# Patient Record
Sex: FEMALE | Race: BLACK OR AFRICAN AMERICAN | NOT HISPANIC OR LATINO
[De-identification: names, ages, dates, MRNs, and addresses within clinical notes are randomized per-mention and may not be internally consistent; named-entity substitution may affect disease eponyms.]

---

## 2023-05-23 ENCOUNTER — APPOINTMENT (OUTPATIENT)
Dept: ORTHOPEDIC SURGERY | Facility: CLINIC | Age: 68
End: 2023-05-23
Payer: MEDICARE

## 2023-05-23 VITALS — BODY MASS INDEX: 34.75 KG/M2 | HEIGHT: 60 IN | WEIGHT: 177 LBS

## 2023-05-23 DIAGNOSIS — Z78.9 OTHER SPECIFIED HEALTH STATUS: ICD-10-CM

## 2023-05-23 PROBLEM — Z00.00 ENCOUNTER FOR PREVENTIVE HEALTH EXAMINATION: Status: ACTIVE | Noted: 2023-05-23

## 2023-05-23 PROCEDURE — 99204 OFFICE O/P NEW MOD 45 MIN: CPT

## 2023-05-23 PROCEDURE — 72110 X-RAY EXAM L-2 SPINE 4/>VWS: CPT

## 2023-05-30 NOTE — ASSESSMENT
[FreeTextEntry1] : 67 year old female presents with acute exacerbation of chronic low back pain. The pain radiates to her legs.  She has paresthesias and numbness in the left leg.  Pain is worse in the left than the right.  She is otherwise neurologically intact She is limited in the distance she can walk due to pain. In the past she was told she has stenosis.  She will be sent for a lumbar MRI. She can continue gabapentin. She can continue PT. She will followup after her lumbar MRI. We discussed red flag symptoms that would require emergent evaluation. She knows to call with any questions or concerns or if her symptoms acutely worsen.

## 2023-05-30 NOTE — HISTORY OF PRESENT ILLNESS
[de-identified] : 67 year old female presents with acute exacerbation of chronic low back pain. The pain radiates to her legs.  She has paresthesias and numbness in the left leg.  Pain is worse in the left than the right.  She denies recent illness, fevers, weakness, balance problems, saddle anesthesia, urinary retention or fecal incontinence. She is limited in the distance she can walk due to pain. In the past she was told she has stenosis.  She takes gabapentin with minimal relief.

## 2023-05-30 NOTE — PHYSICAL EXAM
[de-identified] : General: No acute distress, conversant, well-nourished.\par Head: Normocephalic, atraumatic\par Neck: trachea midline, FROM\par Heart: normotensive and normal rate and rhythm\par Lungs: No labored breathing\par Skin: No abrasions, no rashes, no edema\par Psych: Alert and oriented to person, place and time\par Extremities: no peripheral edema or digital cyanosis\par Gait: Normal gait. Can perform tandem gait.  \par Vascular: warm and well perfused distally, palpable distal pulses\par \par MSK:\par Lumbar spine:\par No tenderness to palpation.  No step-off, no deformity.\par \par NEURO EXAM:\par Sensation \par Left L2  -  2/2            \par Left L3  -  2/2\par Left L4  -  2/2\par Left L5  -  2/2\par Left S1  -  2/2\par \par Right L2  -  2/2            \par Right L3  -  2/2\par Right L4  -  2/2\par Right L5  -  2/2\par Right S1  -  2/2\par \par Motor: \par Left L2 (hip flexion)                            5/5                \par Left L3 (knee extension)                   5/5                \par Left L4 (ankle dorsiflexion)                 5/5                \par Left L5 (long toe extensor)                5/5                \par Left S1 (ankle plantar flexion)           5/5\par \par Right L2 (hip flexion)                            5/5                \par Right L3 (knee extension)                   5/5                \par Right L4 (ankle dorsiflexion)                 5/5                \par Right L5 (long toe extensor)                5/5                \par Right S1 (ankle plantar flexion)           5/5\par \par Reflexes: Normal and symmetric\par Negative clonus.  Down-going Babinski.   [de-identified] : I ordered radiographs to evaluate the patient's symptoms.\par \par Lumbar 4 view radiographs taken in the office today show no dislocation or fracture.  Lumbar spondylosis.  No instability on dynamic series.

## 2023-06-15 ENCOUNTER — APPOINTMENT (OUTPATIENT)
Dept: ORTHOPEDIC SURGERY | Facility: CLINIC | Age: 68
End: 2023-06-15
Payer: MEDICAID

## 2023-06-15 PROCEDURE — 99214 OFFICE O/P EST MOD 30 MIN: CPT

## 2023-06-18 NOTE — PHYSICAL EXAM
[de-identified] : Lumbar MRI (6/13/23) Multilevel degenerative changes contributing to moderate spinal canal stenosis at L4-5 and mild thecal sac impingement at T11-12 and L3-4.\par \par Mild to moderate foraminal stenosis bilaterally at L3-4 and L4-5, and mild bilaterally at L2-3.\par \par Grade 1 anterolisthesis of L4 on L5.\par \par Mild to moderate multilevel facet arthrosis, worst at L4-5.\par \par Lumbar 4 view radiographs taken in the office today show no dislocation or fracture.  Lumbar spondylosis.  No instability on dynamic series. [de-identified] : General: No acute distress, conversant, well-nourished.\par Head: Normocephalic, atraumatic\par Neck: trachea midline, FROM\par Heart: normotensive and normal rate and rhythm\par Lungs: No labored breathing\par Skin: No abrasions, no rashes, no edema\par Psych: Alert and oriented to person, place and time\par Extremities: no peripheral edema or digital cyanosis\par Gait: Normal gait. Can perform tandem gait.  \par Vascular: warm and well perfused distally, palpable distal pulses\par \par MSK:\par Lumbar spine:\par No tenderness to palpation.  No step-off, no deformity.\par \par NEURO EXAM:\par Sensation \par Left L2  -  2/2            \par Left L3  -  2/2\par Left L4  -  2/2\par Left L5  -  2/2\par Left S1  -  2/2\par \par Right L2  -  2/2            \par Right L3  -  2/2\par Right L4  -  2/2\par Right L5  -  2/2\par Right S1  -  2/2\par \par Motor: \par Left L2 (hip flexion)                            5/5                \par Left L3 (knee extension)                   5/5                \par Left L4 (ankle dorsiflexion)                 5/5                \par Left L5 (long toe extensor)                5/5                \par Left S1 (ankle plantar flexion)           5/5\par \par Right L2 (hip flexion)                            5/5                \par Right L3 (knee extension)                   5/5                \par Right L4 (ankle dorsiflexion)                 5/5                \par Right L5 (long toe extensor)                5/5                \par Right S1 (ankle plantar flexion)           5/5\par \par Reflexes: Normal and symmetric\par Negative clonus.  Down-going Babinski.

## 2023-06-18 NOTE — ASSESSMENT
[FreeTextEntry1] : 67 year old female presents with acute exacerbation of chronic low back pain. The pain radiates to her legs.  She has paresthesias and numbness in the left leg.  Pain is worse in the left than the right.  She is otherwise neurologically intact. She is limited in the distance she can walk due to pain. The pain has worsened since her last visit. We reviewed her imaging including her recent MRI. She has L4-L5 spondylolisthesis and stenosis. We discussed treatment options including physical therapy, medications, spinal injections and surgery. Surgery would be a L4-L5 TLIF. We discussed the risks and benefits of surgery.  The risks include anesthesia, blood loss, need for blood transfusion, clots, stroke, myocardial infarct, chronic pain, loss of function, durotomy, infection, hardware failure, nonunion, adjacent segment disease, need for reoperation and damage to neurovascular structures. She understands the risks.  She asked several great questions. She will be scheduled for surgery. We discussed red flag symptoms that would require emergent evaluation. She knows to call with any questions or concerns or if her symptoms acutely worsen.

## 2023-06-18 NOTE — HISTORY OF PRESENT ILLNESS
[de-identified] : 67 year old female presents with acute exacerbation of chronic low back pain. The pain radiates to her legs.  She has paresthesias and numbness in the left leg.  Pain is worse in the left than the right.  She denies recent illness, fevers, weakness, balance problems, saddle anesthesia, urinary retention or fecal incontinence. She is limited in the distance she can walk due to pain. The pain has worsened since her last visit. She is here to review her recent MRI.

## 2023-07-21 ENCOUNTER — NON-APPOINTMENT (OUTPATIENT)
Age: 68
End: 2023-07-21

## 2023-07-21 ENCOUNTER — APPOINTMENT (OUTPATIENT)
Dept: INTERNAL MEDICINE | Facility: CLINIC | Age: 68
End: 2023-07-21
Payer: MEDICARE

## 2023-07-21 VITALS
HEIGHT: 60 IN | WEIGHT: 175 LBS | HEART RATE: 87 BPM | DIASTOLIC BLOOD PRESSURE: 84 MMHG | TEMPERATURE: 97.8 F | OXYGEN SATURATION: 95 % | SYSTOLIC BLOOD PRESSURE: 129 MMHG | BODY MASS INDEX: 34.36 KG/M2

## 2023-07-21 DIAGNOSIS — Z86.69 PERSONAL HISTORY OF OTHER DISEASES OF THE NERVOUS SYSTEM AND SENSE ORGANS: ICD-10-CM

## 2023-07-21 DIAGNOSIS — E78.5 HYPERLIPIDEMIA, UNSPECIFIED: ICD-10-CM

## 2023-07-21 DIAGNOSIS — Z82.49 FAMILY HISTORY OF ISCHEMIC HEART DISEASE AND OTHER DISEASES OF THE CIRCULATORY SYSTEM: ICD-10-CM

## 2023-07-21 DIAGNOSIS — Z01.818 ENCOUNTER FOR OTHER PREPROCEDURAL EXAMINATION: ICD-10-CM

## 2023-07-21 DIAGNOSIS — J45.998 OTHER ASTHMA: ICD-10-CM

## 2023-07-21 DIAGNOSIS — I10 ESSENTIAL (PRIMARY) HYPERTENSION: ICD-10-CM

## 2023-07-21 DIAGNOSIS — J30.2 OTHER SEASONAL ALLERGIC RHINITIS: ICD-10-CM

## 2023-07-21 PROCEDURE — 93000 ELECTROCARDIOGRAM COMPLETE: CPT

## 2023-07-21 PROCEDURE — 36415 COLL VENOUS BLD VENIPUNCTURE: CPT

## 2023-07-21 PROCEDURE — 99203 OFFICE O/P NEW LOW 30 MIN: CPT | Mod: 25

## 2023-07-21 RX ORDER — MELOXICAM 15 MG/1
15 TABLET ORAL
Refills: 0 | Status: ACTIVE | COMMUNITY

## 2023-07-21 RX ORDER — GABAPENTIN 300 MG/1
300 CAPSULE ORAL
Refills: 0 | Status: ACTIVE | COMMUNITY

## 2023-07-21 RX ORDER — CETIRIZINE HYDROCHLORIDE 10 MG/1
10 TABLET, COATED ORAL
Refills: 0 | Status: ACTIVE | COMMUNITY

## 2023-07-21 RX ORDER — FLUTICASONE PROPIONATE 50 UG/1
50 SPRAY, METERED NASAL
Refills: 0 | Status: ACTIVE | COMMUNITY

## 2023-07-21 RX ORDER — OLMESARTAN MEDOXOMIL, AMLODIPINE BESYLATE AND HYDROCHLOROTHIAZIDE 20; 5; 12.5 MG/1; MG/1; MG/1
20-5-12.5 TABLET, FILM COATED ORAL
Refills: 0 | Status: DISCONTINUED | COMMUNITY
End: 2023-07-21

## 2023-07-21 RX ORDER — ROSUVASTATIN CALCIUM 40 MG/1
40 TABLET, FILM COATED ORAL
Refills: 0 | Status: ACTIVE | COMMUNITY

## 2023-07-21 RX ORDER — BUDESONIDE AND FORMOTEROL FUMARATE DIHYDRATE 160; 4.5 UG/1; UG/1
160-4.5 AEROSOL RESPIRATORY (INHALATION)
Refills: 0 | Status: ACTIVE | COMMUNITY

## 2023-07-21 RX ORDER — OLMESARTAN MEDOXOMIL AND HYDROCHLOROTHIAZIDE 20; 12.5 MG/1; MG/1
20-12.5 TABLET ORAL
Refills: 0 | Status: ACTIVE | COMMUNITY

## 2023-07-24 LAB
ALBUMIN SERPL ELPH-MCNC: 4.7 G/DL
ALP BLD-CCNC: 82 U/L
ALT SERPL-CCNC: 27 U/L
ANION GAP SERPL CALC-SCNC: 12 MMOL/L
APTT BLD: 30.1 SEC
AST SERPL-CCNC: 26 U/L
BILIRUB SERPL-MCNC: 0.5 MG/DL
BUN SERPL-MCNC: 18 MG/DL
CALCIUM SERPL-MCNC: 10.1 MG/DL
CHLORIDE SERPL-SCNC: 102 MMOL/L
CO2 SERPL-SCNC: 23 MMOL/L
CREAT SERPL-MCNC: 0.92 MG/DL
EGFR: 69 ML/MIN/1.73M2
ESTIMATED AVERAGE GLUCOSE: 126 MG/DL
GLUCOSE SERPL-MCNC: 94 MG/DL
HBA1C MFR BLD HPLC: 6 %
INR PPP: 1.06 RATIO
POTASSIUM SERPL-SCNC: 4 MMOL/L
PROT SERPL-MCNC: 8.1 G/DL
PT BLD: 12.3 SEC
SODIUM SERPL-SCNC: 137 MMOL/L

## 2023-07-24 NOTE — HISTORY OF PRESENT ILLNESS
[No Pertinent Cardiac History] : no history of aortic stenosis, atrial fibrillation, coronary artery disease, recent myocardial infarction, or implantable device/pacemaker [Asthma] : asthma [No Adverse Anesthesia Reaction] : no adverse anesthesia reaction in self or family member [(Patient denies any chest pain, claudication, dyspnea on exertion, orthopnea, palpitations or syncope)] : Patient denies any chest pain, claudication, dyspnea on exertion, orthopnea, palpitations or syncope [Moderate (4-6 METs)] : Moderate (4-6 METs) [COPD] : no COPD [Sleep Apnea] : no sleep apnea [Smoker] : not a smoker [Chronic Anticoagulation] : no chronic anticoagulation [Chronic Kidney Disease] : no chronic kidney disease [Diabetes] : no diabetes [FreeTextEntry1] : L4-L5 TLIF [FreeTextEntry2] : 8/7/23 [FreeTextEntry3] : Dr. Madera [FreeTextEntry4] : Ms. THEE WEBER is a 67 year old female with history of Lumbar radiculopathy, HTN, HLD, and obesity presenting today to the [facility] prior to spine surgery.  [FreeTextEntry8] : going to the gym and working with  on core strengthening. limited by back pain

## 2023-07-24 NOTE — ASSESSMENT
[High Risk Surgery - Intraperitoneal, Intrathoracic or Supringuinal Vascular Procedures] : High Risk Surgery - Intraperitoneal, Intrathoracic or Supringuinal Vascular Procedures - No (0) [Ischemic Heart Disease] : Ischemic Heart Disease - No (0) [Congestive Heart Failure] : Congestive Heart Failure - No (0) [Prior Cerebrovascular Accident or TIA] : Prior Cerebrovascular Accident or TIA - No (0) [Creatinine >= 2mg/dL (1 Point)] : Creatinine >= 2mg/dL - No (0) [Insulin-dependent Diabetic (1 Point)] : Insulin-dependent Diabetic - No (0) [0] : 0 , RCRI Class: I, Risk of Post-Op Cardiac Complications: 3.9%, 95% CI for Risk Estimate: 2.8% - 5.4% [FreeTextEntry4] : Ms. THEE WEBER is a 67 year old female with history of Lumbar radiculopathy, HTN, HLD, and obesity presenting today to the [facility] prior to Transforaminal Lumbar Interbody Fusion L4-L5. Able to achieve >4 METS and ECG with nonspecific changes. Will obtain prior ECG from PMD for comparison and check labs today.

## 2023-07-28 ENCOUNTER — NON-APPOINTMENT (OUTPATIENT)
Age: 68
End: 2023-07-28

## 2023-08-04 VITALS
DIASTOLIC BLOOD PRESSURE: 86 MMHG | WEIGHT: 177.03 LBS | TEMPERATURE: 98 F | HEART RATE: 66 BPM | SYSTOLIC BLOOD PRESSURE: 124 MMHG | HEIGHT: 60 IN | OXYGEN SATURATION: 95 % | RESPIRATION RATE: 16 BRPM

## 2023-08-04 RX ORDER — POVIDONE-IODINE 5 %
1 AEROSOL (ML) TOPICAL ONCE
Refills: 0 | Status: COMPLETED | OUTPATIENT
Start: 2023-08-07 | End: 2023-08-07

## 2023-08-04 NOTE — H&P ADULT - HISTORY OF PRESENT ILLNESS
67F c/o lower back pain x       Presents for a L4-L5 TLIF with Dr. Madera 67F c/o lower back pain for over 15y. 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 Pt. has numbness in her left leg. Pt. has increased pain with bending over and walking up/down stairs. She has tried conservative treatments including MAXIMO x 3 and physical therapy with no relief of pain.   Presents for a L4-L5 TLIF with Dr. Madera

## 2023-08-04 NOTE — ASU PATIENT PROFILE, ADULT - FALL HARM RISK - HARM RISK INTERVENTIONS

## 2023-08-04 NOTE — H&P ADULT - NSHPLABSRESULTS_GEN_ALL_CORE
Preop CBC, BMP, PT/INR, PTT within normal range and reviewed per medical clearance  Cr- 0.92  A1C 6.0  Preop CXR within normal limits and reviewed per medical clearance   Preop EKG 75bpm NSR within normal limits and reviewed per medical clearance  3M: DOS  T&S DOS

## 2023-08-04 NOTE — H&P ADULT - NSHPPHYSICALEXAM_GEN_ALL_CORE
Gen: 67F NAD  MSK: Decreased lumbar spine ROM secondary to pain      Rest of PE per MD clearance Gen: 67F NAD  MSK: bilateral lower extremities skin intact, no erythema/ecchymosis. SLT INTACT, DP/PT 2+, EHL/TA/GS 5/5. Decreased lumbar spine ROM secondary to pain      Rest of PE per MD clearance

## 2023-08-04 NOTE — H&P ADULT - PROBLEM SELECTOR PLAN 1
Admit to Orthopedic Service for elective L4-5 TLIF     Medically cleared and optimized for surgery by Dr. Ceja

## 2023-08-04 NOTE — H&P ADULT - NSICDXPASTMEDICALHX_GEN_ALL_CORE_FT
PAST MEDICAL HISTORY:  History of carpal tunnel syndrome     History of low back pain     Lumbar radiculopathy     Lumbar stenosis with neurogenic claudication     Spondylolisthesis L4-L5 level

## 2023-08-04 NOTE — H&P ADULT - NSICDXPASTSURGICALHX_GEN_ALL_CORE_FT
PAST SURGICAL HISTORY:  Elective surgery polyp removal from uterus    Elective surgery surgery for carpal tunnel syndrome

## 2023-08-06 ENCOUNTER — TRANSCRIPTION ENCOUNTER (OUTPATIENT)
Age: 68
End: 2023-08-06

## 2023-08-07 ENCOUNTER — INPATIENT (INPATIENT)
Facility: HOSPITAL | Age: 68
LOS: 8 days | Discharge: HOME HEALTH | DRG: 454 | End: 2023-08-16
Attending: STUDENT IN AN ORGANIZED HEALTH CARE EDUCATION/TRAINING PROGRAM | Admitting: STUDENT IN AN ORGANIZED HEALTH CARE EDUCATION/TRAINING PROGRAM
Payer: MEDICARE

## 2023-08-07 ENCOUNTER — TRANSCRIPTION ENCOUNTER (OUTPATIENT)
Age: 68
End: 2023-08-07

## 2023-08-07 ENCOUNTER — APPOINTMENT (OUTPATIENT)
Dept: ORTHOPEDIC SURGERY | Facility: HOSPITAL | Age: 68
End: 2023-08-07

## 2023-08-07 DIAGNOSIS — Z41.9 ENCOUNTER FOR PROCEDURE FOR PURPOSES OTHER THAN REMEDYING HEALTH STATE, UNSPECIFIED: Chronic | ICD-10-CM

## 2023-08-07 DIAGNOSIS — M54.50 LOW BACK PAIN, UNSPECIFIED: ICD-10-CM

## 2023-08-07 LAB
BLD GP AB SCN SERPL QL: NEGATIVE — SIGNIFICANT CHANGE UP
RH IG SCN BLD-IMP: NEGATIVE — SIGNIFICANT CHANGE UP

## 2023-08-07 PROCEDURE — 20939 BONE MARROW ASPIR BONE GRFG: CPT

## 2023-08-07 PROCEDURE — 22840 INSERT SPINE FIXATION DEVICE: CPT

## 2023-08-07 PROCEDURE — 22853 INSJ BIOMECHANICAL DEVICE: CPT

## 2023-08-07 PROCEDURE — 61783 SCAN PROC SPINAL: CPT

## 2023-08-07 PROCEDURE — 22633 ARTHRD CMBN 1NTRSPC LUMBAR: CPT

## 2023-08-07 DEVICE — SET SCREW NXG: Type: IMPLANTABLE DEVICE | Status: FUNCTIONAL

## 2023-08-07 DEVICE — IMPLANTABLE DEVICE: Type: IMPLANTABLE DEVICE | Status: FUNCTIONAL

## 2023-08-07 DEVICE — HEAD POLY BODY TOP LOAD: Type: IMPLANTABLE DEVICE | Status: FUNCTIONAL

## 2023-08-07 DEVICE — SURGIFOAM PAD 8CM X 12.5CM X 10MM (100): Type: IMPLANTABLE DEVICE | Status: FUNCTIONAL

## 2023-08-07 DEVICE — CAGE LORDOTIC FORZA XP 8.5X10X24MM: Type: IMPLANTABLE DEVICE | Status: FUNCTIONAL

## 2023-08-07 DEVICE — SURGIFLO HEMOSTATIC MATRIX KIT: Type: IMPLANTABLE DEVICE | Status: FUNCTIONAL

## 2023-08-07 DEVICE — ROD PRE-LORDOSED 5.5X45MM: Type: IMPLANTABLE DEVICE | Status: FUNCTIONAL

## 2023-08-07 RX ORDER — ONDANSETRON 8 MG/1
4 TABLET, FILM COATED ORAL EVERY 6 HOURS
Refills: 0 | Status: DISCONTINUED | OUTPATIENT
Start: 2023-08-07 | End: 2023-08-16

## 2023-08-07 RX ORDER — ATORVASTATIN CALCIUM 80 MG/1
80 TABLET, FILM COATED ORAL DAILY
Refills: 0 | Status: DISCONTINUED | OUTPATIENT
Start: 2023-08-07 | End: 2023-08-16

## 2023-08-07 RX ORDER — CHLORHEXIDINE GLUCONATE 213 G/1000ML
1 SOLUTION TOPICAL EVERY 12 HOURS
Refills: 0 | Status: COMPLETED | OUTPATIENT
Start: 2023-08-07 | End: 2023-08-07

## 2023-08-07 RX ORDER — SODIUM CHLORIDE 9 MG/ML
1000 INJECTION, SOLUTION INTRAVENOUS
Refills: 0 | Status: DISCONTINUED | OUTPATIENT
Start: 2023-08-07 | End: 2023-08-07

## 2023-08-07 RX ORDER — ROSUVASTATIN CALCIUM 5 MG/1
1 TABLET ORAL
Refills: 0 | DISCHARGE

## 2023-08-07 RX ORDER — GABAPENTIN 400 MG/1
300 CAPSULE ORAL DAILY
Refills: 0 | Status: DISCONTINUED | OUTPATIENT
Start: 2023-08-07 | End: 2023-08-09

## 2023-08-07 RX ORDER — CEFAZOLIN SODIUM 1 G
2000 VIAL (EA) INJECTION EVERY 8 HOURS
Refills: 0 | Status: DISCONTINUED | OUTPATIENT
Start: 2023-08-07 | End: 2023-08-07

## 2023-08-07 RX ORDER — ACETAMINOPHEN 500 MG
1000 TABLET ORAL EVERY 8 HOURS
Refills: 0 | Status: DISCONTINUED | OUTPATIENT
Start: 2023-08-07 | End: 2023-08-16

## 2023-08-07 RX ORDER — BUDESONIDE AND FORMOTEROL FUMARATE DIHYDRATE 160; 4.5 UG/1; UG/1
2 AEROSOL RESPIRATORY (INHALATION) DAILY
Refills: 0 | Status: DISCONTINUED | OUTPATIENT
Start: 2023-08-07 | End: 2023-08-16

## 2023-08-07 RX ORDER — METOCLOPRAMIDE HCL 10 MG
10 TABLET ORAL EVERY 8 HOURS
Refills: 0 | Status: DISCONTINUED | OUTPATIENT
Start: 2023-08-07 | End: 2023-08-16

## 2023-08-07 RX ORDER — METHOCARBAMOL 500 MG/1
500 TABLET, FILM COATED ORAL EVERY 8 HOURS
Refills: 0 | Status: DISCONTINUED | OUTPATIENT
Start: 2023-08-07 | End: 2023-08-10

## 2023-08-07 RX ORDER — OXYCODONE HYDROCHLORIDE 5 MG/1
5 TABLET ORAL EVERY 4 HOURS
Refills: 0 | Status: DISCONTINUED | OUTPATIENT
Start: 2023-08-07 | End: 2023-08-08

## 2023-08-07 RX ORDER — LOSARTAN POTASSIUM 100 MG/1
50 TABLET, FILM COATED ORAL DAILY
Refills: 0 | Status: DISCONTINUED | OUTPATIENT
Start: 2023-08-07 | End: 2023-08-08

## 2023-08-07 RX ORDER — SENNA PLUS 8.6 MG/1
2 TABLET ORAL
Refills: 0 | Status: DISCONTINUED | OUTPATIENT
Start: 2023-08-07 | End: 2023-08-11

## 2023-08-07 RX ORDER — APREPITANT 80 MG/1
40 CAPSULE ORAL ONCE
Refills: 0 | Status: COMPLETED | OUTPATIENT
Start: 2023-08-07 | End: 2023-08-07

## 2023-08-07 RX ORDER — OLMESARTAN MEDOXOMIL-HYDROCHLOROTHIAZIDE 25; 40 MG/1; MG/1
1 TABLET, FILM COATED ORAL
Refills: 0 | DISCHARGE

## 2023-08-07 RX ORDER — CEFAZOLIN SODIUM 1 G
2000 VIAL (EA) INJECTION EVERY 8 HOURS
Refills: 0 | Status: COMPLETED | OUTPATIENT
Start: 2023-08-07 | End: 2023-08-08

## 2023-08-07 RX ORDER — MELOXICAM 15 MG/1
1 TABLET ORAL
Refills: 0 | DISCHARGE

## 2023-08-07 RX ORDER — HYDROMORPHONE HYDROCHLORIDE 2 MG/ML
0.5 INJECTION INTRAMUSCULAR; INTRAVENOUS; SUBCUTANEOUS
Refills: 0 | Status: DISCONTINUED | OUTPATIENT
Start: 2023-08-07 | End: 2023-08-08

## 2023-08-07 RX ORDER — ACETAMINOPHEN 500 MG
1000 TABLET ORAL ONCE
Refills: 0 | Status: COMPLETED | OUTPATIENT
Start: 2023-08-07 | End: 2023-08-07

## 2023-08-07 RX ORDER — CETIRIZINE HYDROCHLORIDE 10 MG/1
1 TABLET ORAL
Refills: 0 | DISCHARGE

## 2023-08-07 RX ORDER — LANOLIN ALCOHOL/MO/W.PET/CERES
5 CREAM (GRAM) TOPICAL AT BEDTIME
Refills: 0 | Status: DISCONTINUED | OUTPATIENT
Start: 2023-08-07 | End: 2023-08-16

## 2023-08-07 RX ADMIN — OXYCODONE HYDROCHLORIDE 5 MILLIGRAM(S): 5 TABLET ORAL at 19:32

## 2023-08-07 RX ADMIN — APREPITANT 40 MILLIGRAM(S): 80 CAPSULE ORAL at 07:42

## 2023-08-07 RX ADMIN — METHOCARBAMOL 500 MILLIGRAM(S): 500 TABLET, FILM COATED ORAL at 22:33

## 2023-08-07 RX ADMIN — Medication 1000 MILLIGRAM(S): at 22:33

## 2023-08-07 RX ADMIN — GABAPENTIN 300 MILLIGRAM(S): 400 CAPSULE ORAL at 22:32

## 2023-08-07 RX ADMIN — Medication 100 MILLIGRAM(S): at 21:19

## 2023-08-07 RX ADMIN — OXYCODONE HYDROCHLORIDE 5 MILLIGRAM(S): 5 TABLET ORAL at 20:20

## 2023-08-07 RX ADMIN — ATORVASTATIN CALCIUM 80 MILLIGRAM(S): 80 TABLET, FILM COATED ORAL at 22:33

## 2023-08-07 RX ADMIN — Medication 1 APPLICATION(S): at 07:42

## 2023-08-07 RX ADMIN — Medication 1000 MILLIGRAM(S): at 07:37

## 2023-08-07 RX ADMIN — HYDROMORPHONE HYDROCHLORIDE 0.5 MILLIGRAM(S): 2 INJECTION INTRAMUSCULAR; INTRAVENOUS; SUBCUTANEOUS at 14:30

## 2023-08-07 RX ADMIN — Medication 5 MILLIGRAM(S): at 22:33

## 2023-08-07 RX ADMIN — CHLORHEXIDINE GLUCONATE 1 APPLICATION(S): 213 SOLUTION TOPICAL at 07:42

## 2023-08-07 NOTE — PHYSICAL THERAPY INITIAL EVALUATION ADULT - ADDITIONAL COMMENTS
Pt currently resides in private home w/ son - 21 [address]. Bedroom is on 3rd floor however family has set up bedroom space on 1st floor for post-surgery. Primarily amb indep w/o AD. Indep w/ showering and dressing tasks. Experienced 1 fall within past 6 months while attempting to come out of bathtub - able to self-recover.

## 2023-08-07 NOTE — PHYSICAL THERAPY INITIAL EVALUATION ADULT - GAIT DEVIATIONS NOTED, PT EVAL
Pt w/ dec weight shifting abilities/slightly unsteady gait however no LOB/falls or knee buckling observed. Dec step length and jane noted. Required assist w/ RW management during turns./decreased jane/increased time in double stance/decreased velocity of limb motion/decreased step length/decreased weight-shifting ability

## 2023-08-07 NOTE — PHYSICAL THERAPY INITIAL EVALUATION ADULT - PERTINENT HX OF CURRENT PROBLEM, REHAB EVAL
67F c/o lower back pain for over 15y. 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 Pt. has numbness in her left leg. Pt. has increased pain with bending over and walking up/down stairs. She has tried conservative treatments including MAXIMO x 3 and physical therapy with no relief of pain.

## 2023-08-07 NOTE — BRIEF OPERATIVE NOTE - NSICDXBRIEFPROCEDURE_GEN_ALL_CORE_FT
PROCEDURES:  Fusion, spine, TLIF, minimally invasive, 1 level, with discectomy or laminectomy 07-Aug-2023 14:35:34  Fariba Hurley J

## 2023-08-07 NOTE — PROGRESS NOTE ADULT - SUBJECTIVE AND OBJECTIVE BOX
Orthopedics Post Op Check    Procedure: L4-L5 TLIF   Surgeon: Dr Madera     Pt comfortable, without complaints. Pain well controlled in PACU- has gotten 2 doses of IV dilaudid.   Denies CP, SOB, N/V, numbness/tingling     Vital Signs Last 24 Hrs  T(C): 36.1 (07 Aug 2023 14:01), Max: 36.6 (07 Aug 2023 07:06)  T(F): 97 (07 Aug 2023 14:01), Max: 97 (07 Aug 2023 14:01)  HR: 80 (07 Aug 2023 15:31) (66 - 90)  BP: 107/74 (07 Aug 2023 15:31) (100/58 - 124/86)  BP(mean): 87 (07 Aug 2023 15:31) (73 - 101)  RR: 25 (07 Aug 2023 15:31) (14 - 30)  SpO2: 92% (07 Aug 2023 15:31) (91% - 95%)    Parameters below as of 07 Aug 2023 15:31    O2 Flow (L/min): 3    AVSS, NAD  General: Pt Alert and oriented, comfortable  Dressing C/D/I - telfa/tegaderm, HV x 2 in place   Sensation  intact and equal BLE   Motor ta/gs/fhl/ehl 5/5 BLE, negatve emily bilaterally   Pulses dp palpable BLE, skin warm and well perfused, cap refill brisk     A/P: 67yFemale POD#0 s/p L4-L5 TLIF doing well    - Stable, monitor drains x 2, AM Labs   - Pain Control  - DVT ppx: SCDs  - Radha-op abx: Ancef   - PT, WBS: WBAT   - F/U AM Labs

## 2023-08-07 NOTE — PRE-ANESTHESIA EVALUATION ADULT - NSANTHOSAYNRD_GEN_A_CORE
No. ESTEFANY screening performed.  STOP BANG Legend: 0-2 = LOW Risk; 3-4 = INTERMEDIATE Risk; 5-8 = HIGH Risk

## 2023-08-07 NOTE — PRE-ANESTHESIA EVALUATION ADULT - NSANTHPEFT_GEN_ALL_CORE
GEN: A&Ox3, NAD, overweight  HEENT: no abnormal facies, neck unremarkable  CV: RRR, no M/R/G  PULM: CTABL, breathing comfortably on RA  NEURO: moves all 4 extremities, no gross deficit

## 2023-08-08 ENCOUNTER — TRANSCRIPTION ENCOUNTER (OUTPATIENT)
Age: 68
End: 2023-08-08

## 2023-08-08 LAB
ANION GAP SERPL CALC-SCNC: 7 MMOL/L — SIGNIFICANT CHANGE UP (ref 5–17)
BASOPHILS # BLD AUTO: 0.02 K/UL — SIGNIFICANT CHANGE UP (ref 0–0.2)
BASOPHILS NFR BLD AUTO: 0.2 % — SIGNIFICANT CHANGE UP (ref 0–2)
BUN SERPL-MCNC: 14 MG/DL — SIGNIFICANT CHANGE UP (ref 7–23)
CALCIUM SERPL-MCNC: 9.5 MG/DL — SIGNIFICANT CHANGE UP (ref 8.4–10.5)
CHLORIDE SERPL-SCNC: 104 MMOL/L — SIGNIFICANT CHANGE UP (ref 96–108)
CO2 SERPL-SCNC: 27 MMOL/L — SIGNIFICANT CHANGE UP (ref 22–31)
CREAT SERPL-MCNC: 0.89 MG/DL — SIGNIFICANT CHANGE UP (ref 0.5–1.3)
EGFR: 71 ML/MIN/1.73M2 — SIGNIFICANT CHANGE UP
EOSINOPHIL # BLD AUTO: 0 K/UL — SIGNIFICANT CHANGE UP (ref 0–0.5)
EOSINOPHIL NFR BLD AUTO: 0 % — SIGNIFICANT CHANGE UP (ref 0–6)
GLUCOSE SERPL-MCNC: 128 MG/DL — HIGH (ref 70–99)
HCT VFR BLD CALC: 37.2 % — SIGNIFICANT CHANGE UP (ref 34.5–45)
HGB BLD-MCNC: 12.4 G/DL — SIGNIFICANT CHANGE UP (ref 11.5–15.5)
IMM GRANULOCYTES NFR BLD AUTO: 0.5 % — SIGNIFICANT CHANGE UP (ref 0–0.9)
LYMPHOCYTES # BLD AUTO: 18.9 % — SIGNIFICANT CHANGE UP (ref 13–44)
LYMPHOCYTES # BLD AUTO: 2.09 K/UL — SIGNIFICANT CHANGE UP (ref 1–3.3)
MCHC RBC-ENTMCNC: 30.5 PG — SIGNIFICANT CHANGE UP (ref 27–34)
MCHC RBC-ENTMCNC: 33.3 GM/DL — SIGNIFICANT CHANGE UP (ref 32–36)
MCV RBC AUTO: 91.4 FL — SIGNIFICANT CHANGE UP (ref 80–100)
MONOCYTES # BLD AUTO: 0.72 K/UL — SIGNIFICANT CHANGE UP (ref 0–0.9)
MONOCYTES NFR BLD AUTO: 6.5 % — SIGNIFICANT CHANGE UP (ref 2–14)
NEUTROPHILS # BLD AUTO: 8.17 K/UL — HIGH (ref 1.8–7.4)
NEUTROPHILS NFR BLD AUTO: 73.9 % — SIGNIFICANT CHANGE UP (ref 43–77)
NRBC # BLD: 0 /100 WBCS — SIGNIFICANT CHANGE UP (ref 0–0)
PLATELET # BLD AUTO: 211 K/UL — SIGNIFICANT CHANGE UP (ref 150–400)
POTASSIUM SERPL-MCNC: 4 MMOL/L — SIGNIFICANT CHANGE UP (ref 3.5–5.3)
POTASSIUM SERPL-SCNC: 4 MMOL/L — SIGNIFICANT CHANGE UP (ref 3.5–5.3)
RBC # BLD: 4.07 M/UL — SIGNIFICANT CHANGE UP (ref 3.8–5.2)
RBC # FLD: 13.4 % — SIGNIFICANT CHANGE UP (ref 10.3–14.5)
SODIUM SERPL-SCNC: 138 MMOL/L — SIGNIFICANT CHANGE UP (ref 135–145)
WBC # BLD: 11.06 K/UL — HIGH (ref 3.8–10.5)
WBC # FLD AUTO: 11.06 K/UL — HIGH (ref 3.8–10.5)

## 2023-08-08 PROCEDURE — 99222 1ST HOSP IP/OBS MODERATE 55: CPT

## 2023-08-08 RX ORDER — OXYCODONE HYDROCHLORIDE 5 MG/1
5 TABLET ORAL EVERY 4 HOURS
Refills: 0 | Status: DISCONTINUED | OUTPATIENT
Start: 2023-08-08 | End: 2023-08-10

## 2023-08-08 RX ORDER — ENOXAPARIN SODIUM 100 MG/ML
40 INJECTION SUBCUTANEOUS EVERY 24 HOURS
Refills: 0 | Status: DISCONTINUED | OUTPATIENT
Start: 2023-08-08 | End: 2023-08-16

## 2023-08-08 RX ORDER — OXYCODONE HYDROCHLORIDE 5 MG/1
10 TABLET ORAL EVERY 4 HOURS
Refills: 0 | Status: DISCONTINUED | OUTPATIENT
Start: 2023-08-08 | End: 2023-08-10

## 2023-08-08 RX ORDER — BENZOCAINE AND MENTHOL 5; 1 G/100ML; G/100ML
1 LIQUID ORAL
Refills: 0 | Status: DISCONTINUED | OUTPATIENT
Start: 2023-08-08 | End: 2023-08-16

## 2023-08-08 RX ORDER — SODIUM CHLORIDE 9 MG/ML
1000 INJECTION, SOLUTION INTRAVENOUS ONCE
Refills: 0 | Status: COMPLETED | OUTPATIENT
Start: 2023-08-08 | End: 2023-08-08

## 2023-08-08 RX ADMIN — METHOCARBAMOL 500 MILLIGRAM(S): 500 TABLET, FILM COATED ORAL at 06:44

## 2023-08-08 RX ADMIN — Medication 1000 MILLIGRAM(S): at 14:21

## 2023-08-08 RX ADMIN — METHOCARBAMOL 500 MILLIGRAM(S): 500 TABLET, FILM COATED ORAL at 21:39

## 2023-08-08 RX ADMIN — OXYCODONE HYDROCHLORIDE 5 MILLIGRAM(S): 5 TABLET ORAL at 09:22

## 2023-08-08 RX ADMIN — OXYCODONE HYDROCHLORIDE 5 MILLIGRAM(S): 5 TABLET ORAL at 10:22

## 2023-08-08 RX ADMIN — SODIUM CHLORIDE 1000 MILLILITER(S): 9 INJECTION, SOLUTION INTRAVENOUS at 11:34

## 2023-08-08 RX ADMIN — Medication 1000 MILLIGRAM(S): at 06:44

## 2023-08-08 RX ADMIN — OXYCODONE HYDROCHLORIDE 5 MILLIGRAM(S): 5 TABLET ORAL at 18:30

## 2023-08-08 RX ADMIN — OXYCODONE HYDROCHLORIDE 5 MILLIGRAM(S): 5 TABLET ORAL at 03:31

## 2023-08-08 RX ADMIN — Medication 100 MILLIGRAM(S): at 05:07

## 2023-08-08 RX ADMIN — METHOCARBAMOL 500 MILLIGRAM(S): 500 TABLET, FILM COATED ORAL at 13:21

## 2023-08-08 RX ADMIN — OXYCODONE HYDROCHLORIDE 5 MILLIGRAM(S): 5 TABLET ORAL at 17:30

## 2023-08-08 RX ADMIN — Medication 1000 MILLIGRAM(S): at 22:39

## 2023-08-08 RX ADMIN — OXYCODONE HYDROCHLORIDE 5 MILLIGRAM(S): 5 TABLET ORAL at 02:31

## 2023-08-08 RX ADMIN — GABAPENTIN 300 MILLIGRAM(S): 400 CAPSULE ORAL at 21:39

## 2023-08-08 RX ADMIN — Medication 5 MILLIGRAM(S): at 22:42

## 2023-08-08 RX ADMIN — ATORVASTATIN CALCIUM 80 MILLIGRAM(S): 80 TABLET, FILM COATED ORAL at 21:39

## 2023-08-08 RX ADMIN — Medication 1000 MILLIGRAM(S): at 21:39

## 2023-08-08 RX ADMIN — Medication 1000 MILLIGRAM(S): at 13:21

## 2023-08-08 RX ADMIN — ENOXAPARIN SODIUM 40 MILLIGRAM(S): 100 INJECTION SUBCUTANEOUS at 21:38

## 2023-08-08 NOTE — DISCHARGE NOTE PROVIDER - NSDCMRMEDTOKEN_GEN_ALL_CORE_FT
budesonide-formoterol 160 mcg-4.5 mcg/inh inhalation aerosol: 2 aerosol inhaled as needed for prn  cetirizine 10 mg oral tablet: 1 tablet orally as needed for prn  fluticasone 50 mcg/inh nasal spray: 1 spray in each nostril as needed for prn  gabapentin 300 mg oral capsule: 1 capsule orally once a day (at bedtime)  meloxicam 15 mg oral tablet: 1 tablet orally once a day  olmesartan-hydrochlorothiazide 20 mg-12.5 mg oral tablet: 1 tablet orally once a day  rosuvastatin 40 mg oral tablet: 1 orally once a day  Tylenol Caplet Extra Strength 500 mg oral tablet: 2 orally as needed for prn   budesonide-formoterol 160 mcg-4.5 mcg/inh inhalation aerosol: 2 aerosol inhaled as needed for prn  cetirizine 10 mg oral tablet: 1 tablet orally as needed for prn  fluticasone 50 mcg/inh nasal spray: 1 spray in each nostril as needed for prn  gabapentin 300 mg oral capsule: 2 capsule orally once a day (at bedtime)  meloxicam 15 mg oral tablet: 1 tablet orally once a day  olmesartan-hydrochlorothiazide 20 mg-12.5 mg oral tablet: 1 tablet orally once a day  rosuvastatin 40 mg oral tablet: 1 orally once a day  Tylenol Caplet Extra Strength 500 mg oral tablet: 2 orally as needed for prn   budesonide-formoterol 160 mcg-4.5 mcg/inh inhalation aerosol: 2 aerosol inhaled once a day as needed for prn  cetirizine 10 mg oral tablet: 1 tablet orally as needed for prn  fluticasone 50 mcg/inh nasal spray: 1 spray in each nostril once a day as needed for prn  gabapentin 300 mg oral capsule: 2 capsule orally once a day (at bedtime)  meloxicam 15 mg oral tablet: 1 tablet orally once a day  olmesartan-hydrochlorothiazide 20 mg-12.5 mg oral tablet: 1 tablet orally once a day  oxyCODONE 5 mg oral tablet: 1 tab(s) orally every 6 hours as needed for  severe pain MDD: 4  rosuvastatin 40 mg oral tablet: 1 orally once a day  senna leaf extract oral tablet: 2 tab(s) orally once a day (at bedtime)  Tylenol Caplet Extra Strength 500 mg oral tablet: 2 tablet orally every 8 hours as needed for prn

## 2023-08-08 NOTE — PROGRESS NOTE ADULT - SUBJECTIVE AND OBJECTIVE BOX
Orthopedics Progress note      Pt comfortable, without complaints. Pain controlled.  Denies CP, SOB, N/V, numbness/tingling     Vital Signs Last 24 Hrs  T(C): 36.4 (07 Aug 2023 21:04), Max: 36.6 (07 Aug 2023 07:06)  T(F): 97.5 (07 Aug 2023 21:04), Max: 97.5 (07 Aug 2023 21:04)  HR: 74 (07 Aug 2023 21:04) (60 - 90)  BP: 117/70 (07 Aug 2023 21:04) (100/58 - 124/86)  BP(mean): 96 (07 Aug 2023 17:35) (73 - 101)  RR: 17 (07 Aug 2023 21:04) (14 - 30)  SpO2: 93% (07 Aug 2023 21:04) (91% - 96%)    Parameters below as of 07 Aug 2023 21:04  Patient On (Oxygen Delivery Method): room air      AVSS, NAD  General: Pt Alert and oriented, comfortable  Dressing C/D/I - telfa/tegaderm, HV x 2 in place   Sensation  intact and equal BLE   Motor ta/gs/fhl/ehl 5/5 BLE, negatve emily bilaterally   Pulses dp palpable BLE, skin warm and well perfused, cap refill brisk   Haskins x1    A/P: 67yFemale POD#1 s/p L4-L5 TLIF doing well    - Stable, monitor drains x 2, AM Labs   - Pain Control  - DVT ppx: SCDs  - Radha-op abx: Ancef   - PT, WBS: WBAT

## 2023-08-08 NOTE — CONSULT NOTE ADULT - SUBJECTIVE AND OBJECTIVE BOX
Patient is a 67y old  Female who presents with a chief complaint of lower back pain (08 Aug 2023 02:57)      HPI:  67 year old woman with chronic  lower back pain for over 15y , with recent worsening and radiculopathy not improving with PT , MAXIMO treatment admitted to the  hospital for an elective  L4-L5 TLIF with Dr. Madera.    Her past medical hx includes HTN , HLD     She is not a smoker , but reports chronic Exertional shortness of breath since 2020, after COVID -19 infection     She underwent L4-L5TLIF on 8/7  Currently she is reporting pain in her back , denies chest discomfort         PAST MEDICAL & SURGICAL HISTORY:  History of low back pain      Lumbar radiculopathy      Lumbar stenosis with neurogenic claudication      Spondylolisthesis  L4-L5 level      History of carpal tunnel syndrome      Elective surgery  polyp removal from uterus      Elective surgery  surgery for carpal tunnel syndrome          Home Medications:  budesonide-formoterol 160 mcg-4.5 mcg/inh inhalation aerosol: 2 aerosol inhaled as needed for prn (07 Aug 2023 07:37)  cetirizine 10 mg oral tablet: 1 tablet orally as needed for prn (07 Aug 2023 07:37)  fluticasone 50 mcg/inh nasal spray: 1 spray in each nostril as needed for prn (07 Aug 2023 07:37)  gabapentin 300 mg oral capsule: 1 capsule orally once a day (at bedtime) (07 Aug 2023 07:37)  meloxicam 15 mg oral tablet: 1 tablet orally once a day (07 Aug 2023 07:37)  olmesartan-hydrochlorothiazide 20 mg-12.5 mg oral tablet: 1 tablet orally once a day (07 Aug 2023 07:37)  rosuvastatin 40 mg oral tablet: 1 orally once a day (07 Aug 2023 07:37)  Tylenol Caplet Extra Strength 500 mg oral tablet: 2 orally as needed for prn (07 Aug 2023 07:37)      Allergies    environmental allergy.....sneezing;  itchy eyes (Other)  lisinopril (Other)    Intolerances        FAMILY HISTORY: reviewed and non contributory       Social History: non smoker , no recreational drugs       CURRENT MEDICATIONS:   acetaminophen     Tablet .. 1000 milliGRAM(s) Oral every 8 hours  atorvastatin 80 milliGRAM(s) Oral daily  budesonide 160 MICROgram(s)/formoterol 4.5 MICROgram(s) Inhaler 2 Puff(s) Inhalation daily  gabapentin 300 milliGRAM(s) Oral daily  losartan 50 milliGRAM(s) Oral daily  melatonin 5 milliGRAM(s) Oral at bedtime PRN  methocarbamol 500 milliGRAM(s) Oral every 8 hours  metoclopramide Injectable 10 milliGRAM(s) IV Push every 8 hours PRN  ondansetron   Disintegrating Tablet 4 milliGRAM(s) Oral every 6 hours  oxyCODONE    IR 5 milliGRAM(s) Oral every 4 hours PRN  senna 2 Tablet(s) Oral two times a day PRN      VITAL SIGNS, INS/OUTS (last 24 hours):  Vital Signs Last 24 Hrs  T(C): 36.3 (08 Aug 2023 08:10), Max: 36.4 (07 Aug 2023 21:04)  T(F): 97.4 (08 Aug 2023 08:10), Max: 97.6 (08 Aug 2023 05:30)  HR: 59 (08 Aug 2023 08:10) (59 - 90)  BP: 120/77 (08 Aug 2023 08:10) (96/63 - 124/75)  BP(mean): 96 (07 Aug 2023 17:35) (73 - 101)  RR: 17 (08 Aug 2023 08:10) (14 - 30)  SpO2: 94% (08 Aug 2023 08:10) (91% - 96%)    Parameters below as of 08 Aug 2023 08:10  Patient On (Oxygen Delivery Method): room air      I&O's Summary    07 Aug 2023 07:01  -  08 Aug 2023 07:00  --------------------------------------------------------  IN: 240 mL / OUT: 1790 mL / NET: -1550 mL        EXAM:  GENERAL: NAD, lying in bed comfortably  EYES: EOMI, PERRLA, conjunctiva and sclera clear  ENT: Moist mucous membranes, no mucosal lesions, normal dentition   NECK: Supple, No JVD  CHEST/LUNG: Clear to auscultation bilaterally; No rales, rhonchi, wheezing, or rubs. Unlabored respirations  HEART: Regular rate and rhythm; No murmurs, rubs, or gallops  ABDOMEN: Bowel sounds present; Soft, Nontender, Nondistended. No hepatomegaly  EXTREMITIES:  2+ Peripheral Pulses,  No clubbing, cyanosis, or edema  NERVOUS SYSTEM:  Alert & Oriented X3, speech clear. No deficits   MSK: FROM all 4 extremities, full and equal strength  SKIN: No rashes or lesions. 2 Drains from the back           LABS:                        12.4   11.06 )-----------( 211      ( 08 Aug 2023 05:30 )             37.2     08-08    138  |  104  |  14  ----------------------------<  128<H>  4.0   |  27  |  0.89    Ca    9.5      08 Aug 2023 05:30        Urinalysis Basic - ( 08 Aug 2023 05:30 )    Color: x / Appearance: x / SG: x / pH: x  Gluc: 128 mg/dL / Ketone: x  / Bili: x / Urobili: x   Blood: x / Protein: x / Nitrite: x   Leuk Esterase: x / RBC: x / WBC x   Sq Epi: x / Non Sq Epi: x / Bacteria: x        #Diet - Diet, Regular (08-07-23 @ 16:15) [Active]

## 2023-08-08 NOTE — DISCHARGE NOTE PROVIDER - NSDCCPCAREPLAN_GEN_ALL_CORE_FT
PRINCIPAL DISCHARGE DIAGNOSIS  Diagnosis: Lumbar back pain  Assessment and Plan of Treatment: L4-L5 TLIF

## 2023-08-08 NOTE — DISCHARGE NOTE PROVIDER - NSDCQMAMI_CARD_ALL_CORE
[facility] Neurology Specialist  [address] [place] [id number]  Phone: [phone]  Fax: [phone]    Vic Langston MD        2020     King Ko DO  [address] [facility] [address]    Patient: Jazmin Wilson  MR Number: [id number]  YOB: 1973  Date of Visit: 3/2/2020    Dear Dr. King Connell: Thank you for the request for consultation for Tash Perez to me for the evaluation of Danii Pires  Below are the relevant portions of my assessment and plan of care. NEUROLOGY FOLLOW-UP  Patient Name:  Jazmin Wilson  :   1973  Clinic Visit Date: 3/2/2020    I saw Mr. Jazmin Wilson in follow-up in the office today in continuation of neurologic care. He is a 40 y.o. Left handed  male with a significant family hx of neurofibromatosis comes to the clinic after prolonged absence of about 1 year. Today is the first follow-up visit. He is accompanied by his wife to the clinic today. According to his wife; patient has not kept any of his appointments requested during initial consultation. He had been having twitches of the body and head without any impairment of consciousness. She also stated that he does have some sort of depression and he does not take any of his medications namely Lexapro. He is accompanied by his wife stating that \"my  does not listen to anybody and not taking any of his meds\". Patient stated that he had significant family history of neurofibromatosis. He does not know all the details of family members who suffered with it. He is not aware of their manifestations. He states that his dad, Paternal grand ma., Paternal uncle and aunt., Elder sibling sister  at age 25 from tumors of spine. His nephew  at age 3 from brain tumor and another living nephew had similar problems but he does not want to seek any medical attention. Presently patient denies headaches, dizziness and vision changes. Denies a speech and swallowing difficulties. Denies numbness and weakness. Denies balance difficulties and falls. Denies episodes of loss of consciousness or syncopal attacks. Review of systems done by staff reviewed and pertinent positives include absence of any hospitalizations. Denies fatigue, visual disturbance, confusion, memory loss, numbness, weakness, dizziness, tremors, speech difficulties, gait difficulties, headaches, anxiety and depression, etc as above. Current Outpatient Medications on File Prior to Visit   Medication Sig Dispense Refill    spironolactone (ALDACTONE) 50 MG tablet Take 1 tablet by mouth daily (Patient not taking: Reported on 3/2/2020) 90 tablet 1    methocarbamol (ROBAXIN-750) 750 MG tablet Take 1 tablet by mouth 3 times daily   WARNING:  May cause drowsiness.  May impair ability to operate vehicles or machinery.  Do not use in combination with alcohol. (Patient not taking: Reported on 3/2/2020) 30 tablet 0    escitalopram (LEXAPRO) 10 MG tablet Take 1 tablet by mouth daily (Patient not taking: Reported on 3/2/2020) 30 tablet 3     No current facility-administered medications on file prior to visit. Allergies: Selvin Young is allergic to codeine. Past Medical History:   Diagnosis Date    Asthma        Past Surgical History:   Procedure Laterality Date    HAND SURGERY      TONSILLECTOMY       Social History: Selvin Young  reports that he quit smoking about a year ago. His smoking use included cigarettes. He started smoking about 17 years ago. He has a 15.00 pack-year smoking history. He has never used smokeless tobacco. He reports current alcohol use. He reports that he does not use drugs.     Family History   Problem Relation Age of Onset    Diabetes Mother     Other Father         neurofobromatosis    Heart Disease Father     Cancer Father         prostate    Cancer Sister On exam: Blood pressure 132/89, pulse 69, height 5' 9\" (1.753 m), weight (!) 441 lb (200 kg). GENERAL  Appears comfortable and in no distress; cafe au lait spots; Hyperpigmented macules noted in lower abdomen and lower back; axillary freckling   HEENT  NC/ AT   NECK  Supple and no bruits heard   MENTAL STATUS:  Alert, oriented, intact memory, no confusion, normal speech, normal language, no hallucination or delusion   CRANIAL NERVES: II     -      PERRLA, VA 20/30 OD 20/20 OS; Fundi reveal intact venous pulsations; Visual fields intact to confrontation  III,IV,VI -  EOMs full, no afferent defect, no CAROLEE, no ptosis  V     -     Normal facial sensation  VII    -     Normal facial symmetry  VIII   -     Intact hearing  IX,X -     Symmetrical palate  XI    -     Symmetrical shoulder shrug  XII   -     Midline tongue, no atrophy    MOTOR FUNCTION:  significant for good strength of grade 5/5 in bilateral proximal and distal muscle groups of both upper and lower extremities with normal bulk, normal tone and no involuntary movements, no tremor   SENSORY FUNCTION:  Normal touch, normal pin, normal vibration, normal proprioception   CEREBELLAR FUNCTION:  Intact fine motor control over upper limbs   REFLEX FUNCTION:  Symmetric, no perverted reflex, no Babinski sign   STATION and GAIT  Normal station, normal gait     Brain (with/without) (4/13/2019): Unremarkable MRI of the brain with and without contrast.  No intracranial abnormality. Right parietal scalp soft tissue nodule is nonspecific, but may represent a neurofibroma.         Impression and Plan: Mr. Nani Esqueda is a 40 y.o. male with   Significant family history of neurofibromatosis including his dad, paternal grandma, paternal uncle and aunt and his Elder sibling  Examination significant for intact cognitive examination along with intact cranial nerves without any sensorimotor deficits; but with some of the No

## 2023-08-08 NOTE — DISCHARGE NOTE PROVIDER - NSDCFUADDINST_GEN_ALL_CORE_FT
ACTIVITY:  - No extreme bending, extending, turning, twisting, or straining. No strenuous activity, heavy lifting, driving or returning to work until cleared by MD.    DRESSING: ***  - You may shower, your dressing is water-resistant. Do not soak in bathtubs. Remove dressing after postop day 7, then leave incision open to air. If your dressing gets wet before then, pat dressing dry and reapply dressing.  - Keep your incision clean and dry. Do not pick at your incision. Do not apply creams, ointments or oils to your incision until cleared by your surgeon. Do not soak your incision in sitting water (ie tubs, pools, lakes, etc.) until cleared by your surgeon. You may let clean, running water fall over your incision.    MEDICATION/ANTICOAGULATION:  - You have been prescribed medications for pain:    - Tylenol (Acetaminophen) for mild to moderate pain. Do not exceed 3,000mg daily.    - For more severe pain, you may continue to take the Tylenol with the addition of narcotic pain medication. Take this medication as prescribed. Do not take more than prescribed. Note that this medication may cause drowsiness or dizziness. Do not operate machinery. This medication may cause constipation.  - If you have been prescribed a muscle relaxer, take this medication as needed for muscle spasm. Follow instructions on bottle.  - Try to have regular bowel movements. Take stool softener or laxative if necessary. You may wish to take Miralax daily until you have regular bowel movements.   - Do not take antiinflammatories (Aleve, Advil, Naproxen, Ibuprofen, etc.) until cleared by your surgeon. Tylenol is not an anti-inflammatory and okay to take (see above).  - If you have a pain management physician, please follow-up with them postoperatively.   - If you experience any negative side effects of your medications, please call your surgeon's office to discuss.    Follow-up:  - Call to schedule an appt with Dr. Madera for follow up. If you have staples or sutures they will be removed in office.  - Please follow-up with your primary care physician or any other specialist you see postoperatively, if needed.     - Contact your doctor if you experience: fever greater than 101.5, chills, chest pain, difficulty breathing, redness or excessive drainage around the incision, other concerns.  ACTIVITY:  - No extreme bending, extending, turning, twisting, or straining. No strenuous activity, heavy lifting, driving or returning to work until cleared by MD.    DRESSING:  Prineo (meshlike dressing directly over your incision), telfa (white cloth over prineo), tegaderm (clear adhesive over telfa)  - You may shower, your dressing is water-resistant. Do not soak in bathtubs. Remove dressing after postop day 7, then leave incision open to air. If your dressing gets wet before then, pat dressing dry and reapply dressing.  - Keep your incision clean and dry. Do not pick at your incision. Do not apply creams, ointments or oils to your incision until cleared by your surgeon. Do not soak your incision in sitting water (ie tubs, pools, lakes, etc.) until cleared by your surgeon. You may let clean, running water fall over your incision.    MEDICATION/ANTICOAGULATION:  - You have been prescribed medications for pain:    - Tylenol (Acetaminophen) for mild to moderate pain. Do not exceed 3,000mg daily.    - For more severe pain, you may continue to take the Tylenol with the addition of narcotic pain medication. Take this medication as prescribed. Do not take more than prescribed. Note that this medication may cause drowsiness or dizziness. Do not operate machinery. This medication may cause constipation.  - If you have been prescribed a muscle relaxer, take this medication as needed for muscle spasm. Follow instructions on bottle.  - Try to have regular bowel movements. Take stool softener or laxative if necessary. You may wish to take Miralax daily until you have regular bowel movements.   - Do not take antiinflammatories (Aleve, Advil, Naproxen, Ibuprofen, etc.) until cleared by your surgeon. Tylenol is not an anti-inflammatory and okay to take (see above).  - If you have a pain management physician, please follow-up with them postoperatively.   - If you experience any negative side effects of your medications, please call your surgeon's office to discuss.    Follow-up:  - Call to schedule an appt with Dr. Madera for follow up. If you have staples or sutures they will be removed in office.  - Please follow-up with your primary care physician or any other specialist you see postoperatively, if needed.   - Contact your doctor if you experience: fever greater than 101.5, chills, chest pain, difficulty breathing, redness or excessive drainage around the incision, other concerns.  ACTIVITY:  - No extreme bending, extending, turning, twisting, or straining. No strenuous activity, heavy lifting, driving or returning to work until cleared by MD.    DRESSING:  Prineo (meshlike dressing directly over your incision), telfa (white cloth over prineo), tegaderm (clear adhesive over telfa)  - You may shower, your dressing is water-resistant. Do not soak in bathtubs. Remove dressing after postop day 7, then leave incision open to air. If your dressing gets wet before then, pat dressing dry and reapply dressing.  - Keep your incision clean and dry. Do not pick at your incision. Do not apply creams, ointments or oils to your incision until cleared by your surgeon. Do not soak your incision in sitting water (ie tubs, pools, lakes, etc.) until cleared by your surgeon. You may let clean, running water fall over your incision.    MEDICATION/ANTICOAGULATION:  - You have been prescribed medications for pain:    - Tylenol (Acetaminophen) for mild to moderate pain. Do not exceed 3,000mg daily.    - For more severe pain, you may continue to take the Tylenol with the addition of narcotic pain medication. Take this medication as prescribed. Do not take more than prescribed. Note that this medication may cause drowsiness or dizziness. Do not operate machinery. This medication may cause constipation.  - If you have been prescribed a muscle relaxer, take this medication as needed for muscle spasm. Follow instructions on bottle.  - Try to have regular bowel movements. Take stool softener or laxative if necessary. You may wish to take Miralax daily until you have regular bowel movements.   - Do not take antiinflammatories (Aleve, Advil, Naproxen, Ibuprofen, etc.) until cleared by your surgeon. Tylenol is not an anti-inflammatory and okay to take (see above).  - If you have a pain management physician, please follow-up with them postoperatively.   - If you experience any negative side effects of your medications, please call your surgeon's office to discuss.    Follow-up:  - Call to schedule an appt with Dr. Madera for follow up. If you have staples or sutures they will be removed in office.  - Please follow-up with your primary care physician or any other specialist you see postoperatively, if needed.   - Please schedule your outpatient sleep study to evaluate for ESTEFANY  - Contact your doctor if you experience: fever greater than 101.5, chills, chest pain, difficulty breathing, redness or excessive drainage around the incision, other concerns.

## 2023-08-08 NOTE — PROGRESS NOTE ADULT - SUBJECTIVE AND OBJECTIVE BOX
Ortho Note    Pt seen and examined at bedside. Complaining of lateral right lower extremity tingling and pain worse in the lateral calf. States pain is a 9/10 however has not had pain medication in over 7 hours. Tolerating PO intake. Passing flatus.  Denies CP, SOB, N/V,    Vital Signs Last 24 Hrs  T(C): 36.3 (08-08-23 @ 08:10), Max: 36.3 (08-08-23 @ 08:10)  T(F): 97.4 (08-08-23 @ 08:10), Max: 97.4 (08-08-23 @ 08:10)  HR: 59 (08-08-23 @ 08:10) (59 - 59)  BP: 120/77 (08-08-23 @ 08:10) (120/77 - 120/77)  BP(mean): --  RR: 17 (08-08-23 @ 08:10) (17 - 17)  SpO2: 94% (08-08-23 @ 08:10) (94% - 94%)  I&O's Summary    07 Aug 2023 07:01  -  08 Aug 2023 07:00  --------------------------------------------------------  IN: 240 mL / OUT: 1790 mL / NET: -1550 mL        General: Pt Alert and oriented, NAD  DSG C/D/I- prineo, telfa, tegaderm, HV x 1, JPx 1 draining sanguinous fluid  Pulses: 2+ DP bilateral lower extremities  Sensation: SILT distally bilateral lower extremities  Motor:   EHL/FHL/TA/GS: 5/5 bilaterally                          12.4   11.06 )-----------( 211      ( 08 Aug 2023 05:30 )             37.2     08-08    138  |  104  |  14  ----------------------------<  128<H>  4.0   |  27  |  0.89    Ca    9.5      08 Aug 2023 05:30        A/P: 67yFemale POD #1 s/p L4-L5 TLIF with Dr. Madera  - Stable, hypotensive to 90s/60s with physical therapy this am with dizziness, back in bed dizziness resolved, 1L LR bolus ordered, follow up repeat blood pressure  - Pain Control  - Continue with drains, monitor drain output overnight right HV 30cc, LORENZO: 110cc  - OOB with meals, encourage incentive spirometry  - Discontinue lucas this am, follow up TOV   - DVT ppx: SCDs  - PT, WBS: WBAT   - PT dispo: FIORELLA    Ortho Pager [phone]

## 2023-08-08 NOTE — DISCHARGE NOTE PROVIDER - NSDCCPTREATMENT_GEN_ALL_CORE_FT
PRINCIPAL PROCEDURE  Procedure: Fusion, spine, TLIF, minimally invasive, 1 level, with discectomy or laminectomy  Findings and Treatment: lumbar back pain

## 2023-08-08 NOTE — DISCHARGE NOTE PROVIDER - HOSPITAL COURSE
Admit to Orthopaedics  Procedure: L4-L5 transforaminal lumbar interbody fusion  Perioperative Antibiotics  DVT prophylaxis  Physical Therapy  Pain Management Admit to Orthopaedics  Procedure: L4-L5 transforaminal lumbar interbody fusion  Perioperative Antibiotics  DVT prophylaxis  Physical Therapy  Pain Management   Consultants: Medicine  Inpatient Events:   8/10: Complaints of right calf pain, a lower extremity doppler ultrasound was performed, no evidence of DVT  8/13: WBC uptrending at 13.23, chest xray was performed showing atelectasis. Incentive spirometer was encouraged  8/14: WBC downtrending 10.39

## 2023-08-08 NOTE — DISCHARGE NOTE PROVIDER - CARE PROVIDER_API CALL
Jonathan Madera Víctor  Orthopaedic Surgery  [address] [place] [address], [address]  [place] [address]  Phone: [phone]  Fax: [phone]  Follow Up Time:

## 2023-08-08 NOTE — CONSULT NOTE ADULT - ASSESSMENT
67 year old woman admitted to the hospital for an elective TLIF L4-L5        Impression and Plan   # Lumbar Radiculopathy S/p TLIF L4-L5 on 8/7/23   - pain control , DVT prophylaxis , Weightbearing status , Dressing changes and drain care per ortho team    # Chronic Sinusitis  - Continue Flonase     # HTN   - Continue Losartan inpatient and Discharge on Olmesartan and HCTZ     # HLD   - Atorvastatin     #DVT PPx -    #Dispo - Home     Sterling Alicia   Attending Hospitalist  [phone]

## 2023-08-08 NOTE — PATIENT PROFILE ADULT - PATIENT'S SEXUAL ORIENTATION
[address] [facility]  Discharge- Zachary Price 1941, 79 y o  male MRN: [id number]  Unit/Bed#: [facility] 808-01 Encounter: [id number]  Primary Care Provider: Danica Raman MD   Date and time admitted to hospital: 4/1/2022  4:08 PM    Stenosis of left carotid artery  Assessment & Plan  -Recently admitted to [facility] Dianne Avalos MD  PCP: Darshana Malone Heterosexual

## 2023-08-08 NOTE — OCCUPATIONAL THERAPY INITIAL EVALUATION ADULT - ADDITIONAL COMMENTS
Pt has walk-in shower on first floor and shower/tub on 2nd floor. As per pt, pt will stay on first floor while recovering.

## 2023-08-08 NOTE — OCCUPATIONAL THERAPY INITIAL EVALUATION ADULT - DIAGNOSIS, OT EVAL
Pt p/w post op pain demonstrating decrease balance and strength affecting ADLs and functional mobility.

## 2023-08-08 NOTE — OCCUPATIONAL THERAPY INITIAL EVALUATION ADULT - PERTINENT HX OF CURRENT PROBLEM, REHAB EVAL
67F c/o lower back pain for over 15y. 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 Pt. has numbness in her left leg. Pt. has increased pain with bending over and walking up/down stairs. She has tried conservative treatments including MAXIMO x 3 and physical therapy with no relief of pain.   Pt POD #1 s/p L4-L5 TLIF with Dr. Madera

## 2023-08-08 NOTE — PATIENT PROFILE ADULT - FALL HARM RISK - HARM RISK INTERVENTIONS
Assistance with ambulation/Assistance OOB with selected safe patient handling equipment/Communicate Risk of Fall with Harm to all staff/Discuss with provider need for PT consult/Monitor gait and stability/Provide patient with walking aids - walker, cane, crutches/Reinforce activity limits and safety measures with patient and family/Sit up slowly, dangle for a short time, stand at bedside before walking/Tailored Fall Risk Interventions/Use of alarms - bed, chair and/or voice tab/Visual Cue: Yellow wristband and red socks/Bed in lowest position, wheels locked, appropriate side rails in place/Call bell, personal items and telephone in reach/Instruct patient to call for assistance before getting out of bed or chair/Non-slip footwear when patient is out of bed/[place] to call system/Physically safe environment - no spills, clutter or unnecessary equipment/Purposeful Proactive Rounding/Room/bathroom lighting operational, light cord in reach

## 2023-08-09 LAB
ANION GAP SERPL CALC-SCNC: 6 MMOL/L — SIGNIFICANT CHANGE UP (ref 5–17)
BASOPHILS # BLD AUTO: 0.04 K/UL — SIGNIFICANT CHANGE UP (ref 0–0.2)
BASOPHILS NFR BLD AUTO: 0.4 % — SIGNIFICANT CHANGE UP (ref 0–2)
BUN SERPL-MCNC: 13 MG/DL — SIGNIFICANT CHANGE UP (ref 7–23)
CALCIUM SERPL-MCNC: 9.3 MG/DL — SIGNIFICANT CHANGE UP (ref 8.4–10.5)
CHLORIDE SERPL-SCNC: 103 MMOL/L — SIGNIFICANT CHANGE UP (ref 96–108)
CO2 SERPL-SCNC: 30 MMOL/L — SIGNIFICANT CHANGE UP (ref 22–31)
CREAT SERPL-MCNC: 0.98 MG/DL — SIGNIFICANT CHANGE UP (ref 0.5–1.3)
EGFR: 64 ML/MIN/1.73M2 — SIGNIFICANT CHANGE UP
EOSINOPHIL # BLD AUTO: 0.09 K/UL — SIGNIFICANT CHANGE UP (ref 0–0.5)
EOSINOPHIL NFR BLD AUTO: 0.9 % — SIGNIFICANT CHANGE UP (ref 0–6)
GLUCOSE SERPL-MCNC: 97 MG/DL — SIGNIFICANT CHANGE UP (ref 70–99)
HCT VFR BLD CALC: 36.2 % — SIGNIFICANT CHANGE UP (ref 34.5–45)
HGB BLD-MCNC: 11.8 G/DL — SIGNIFICANT CHANGE UP (ref 11.5–15.5)
IMM GRANULOCYTES NFR BLD AUTO: 0.4 % — SIGNIFICANT CHANGE UP (ref 0–0.9)
LYMPHOCYTES # BLD AUTO: 3.84 K/UL — HIGH (ref 1–3.3)
LYMPHOCYTES # BLD AUTO: 37 % — SIGNIFICANT CHANGE UP (ref 13–44)
MCHC RBC-ENTMCNC: 30.6 PG — SIGNIFICANT CHANGE UP (ref 27–34)
MCHC RBC-ENTMCNC: 32.6 GM/DL — SIGNIFICANT CHANGE UP (ref 32–36)
MCV RBC AUTO: 93.8 FL — SIGNIFICANT CHANGE UP (ref 80–100)
MONOCYTES # BLD AUTO: 0.76 K/UL — SIGNIFICANT CHANGE UP (ref 0–0.9)
MONOCYTES NFR BLD AUTO: 7.3 % — SIGNIFICANT CHANGE UP (ref 2–14)
NEUTROPHILS # BLD AUTO: 5.6 K/UL — SIGNIFICANT CHANGE UP (ref 1.8–7.4)
NEUTROPHILS NFR BLD AUTO: 54 % — SIGNIFICANT CHANGE UP (ref 43–77)
NRBC # BLD: 0 /100 WBCS — SIGNIFICANT CHANGE UP (ref 0–0)
PLATELET # BLD AUTO: 196 K/UL — SIGNIFICANT CHANGE UP (ref 150–400)
POTASSIUM SERPL-MCNC: 3.5 MMOL/L — SIGNIFICANT CHANGE UP (ref 3.5–5.3)
POTASSIUM SERPL-SCNC: 3.5 MMOL/L — SIGNIFICANT CHANGE UP (ref 3.5–5.3)
RBC # BLD: 3.86 M/UL — SIGNIFICANT CHANGE UP (ref 3.8–5.2)
RBC # FLD: 14.2 % — SIGNIFICANT CHANGE UP (ref 10.3–14.5)
SODIUM SERPL-SCNC: 139 MMOL/L — SIGNIFICANT CHANGE UP (ref 135–145)
WBC # BLD: 10.37 K/UL — SIGNIFICANT CHANGE UP (ref 3.8–10.5)
WBC # FLD AUTO: 10.37 K/UL — SIGNIFICANT CHANGE UP (ref 3.8–10.5)

## 2023-08-09 PROCEDURE — 99233 SBSQ HOSP IP/OBS HIGH 50: CPT

## 2023-08-09 RX ORDER — GABAPENTIN 400 MG/1
600 CAPSULE ORAL AT BEDTIME
Refills: 0 | Status: DISCONTINUED | OUTPATIENT
Start: 2023-08-09 | End: 2023-08-16

## 2023-08-09 RX ORDER — HYDROMORPHONE HYDROCHLORIDE 2 MG/ML
0.5 INJECTION INTRAMUSCULAR; INTRAVENOUS; SUBCUTANEOUS ONCE
Refills: 0 | Status: DISCONTINUED | OUTPATIENT
Start: 2023-08-09 | End: 2023-08-10

## 2023-08-09 RX ORDER — GABAPENTIN 400 MG/1
2 CAPSULE ORAL
Refills: 0 | DISCHARGE

## 2023-08-09 RX ADMIN — METHOCARBAMOL 500 MILLIGRAM(S): 500 TABLET, FILM COATED ORAL at 22:38

## 2023-08-09 RX ADMIN — OXYCODONE HYDROCHLORIDE 10 MILLIGRAM(S): 5 TABLET ORAL at 16:04

## 2023-08-09 RX ADMIN — OXYCODONE HYDROCHLORIDE 10 MILLIGRAM(S): 5 TABLET ORAL at 17:04

## 2023-08-09 RX ADMIN — ONDANSETRON 4 MILLIGRAM(S): 8 TABLET, FILM COATED ORAL at 11:20

## 2023-08-09 RX ADMIN — ATORVASTATIN CALCIUM 80 MILLIGRAM(S): 80 TABLET, FILM COATED ORAL at 22:38

## 2023-08-09 RX ADMIN — OXYCODONE HYDROCHLORIDE 5 MILLIGRAM(S): 5 TABLET ORAL at 04:36

## 2023-08-09 RX ADMIN — BUDESONIDE AND FORMOTEROL FUMARATE DIHYDRATE 2 PUFF(S): 160; 4.5 AEROSOL RESPIRATORY (INHALATION) at 11:20

## 2023-08-09 RX ADMIN — OXYCODONE HYDROCHLORIDE 10 MILLIGRAM(S): 5 TABLET ORAL at 22:50

## 2023-08-09 RX ADMIN — OXYCODONE HYDROCHLORIDE 10 MILLIGRAM(S): 5 TABLET ORAL at 11:20

## 2023-08-09 RX ADMIN — SENNA PLUS 2 TABLET(S): 8.6 TABLET ORAL at 17:43

## 2023-08-09 RX ADMIN — OXYCODONE HYDROCHLORIDE 5 MILLIGRAM(S): 5 TABLET ORAL at 03:36

## 2023-08-09 RX ADMIN — OXYCODONE HYDROCHLORIDE 10 MILLIGRAM(S): 5 TABLET ORAL at 12:20

## 2023-08-09 RX ADMIN — METHOCARBAMOL 500 MILLIGRAM(S): 500 TABLET, FILM COATED ORAL at 13:21

## 2023-08-09 RX ADMIN — Medication 1000 MILLIGRAM(S): at 06:25

## 2023-08-09 RX ADMIN — Medication 1000 MILLIGRAM(S): at 22:38

## 2023-08-09 RX ADMIN — GABAPENTIN 600 MILLIGRAM(S): 400 CAPSULE ORAL at 22:38

## 2023-08-09 RX ADMIN — OXYCODONE HYDROCHLORIDE 10 MILLIGRAM(S): 5 TABLET ORAL at 21:50

## 2023-08-09 RX ADMIN — Medication 1000 MILLIGRAM(S): at 14:20

## 2023-08-09 RX ADMIN — Medication 1000 MILLIGRAM(S): at 13:20

## 2023-08-09 RX ADMIN — ENOXAPARIN SODIUM 40 MILLIGRAM(S): 100 INJECTION SUBCUTANEOUS at 22:38

## 2023-08-09 RX ADMIN — METHOCARBAMOL 500 MILLIGRAM(S): 500 TABLET, FILM COATED ORAL at 05:25

## 2023-08-09 RX ADMIN — Medication 1000 MILLIGRAM(S): at 05:25

## 2023-08-09 RX ADMIN — ONDANSETRON 4 MILLIGRAM(S): 8 TABLET, FILM COATED ORAL at 17:07

## 2023-08-09 NOTE — PROGRESS NOTE ADULT - SUBJECTIVE AND OBJECTIVE BOX
Ortho Note    Pt seen and examined at bedside. Passed TOV. Pain controlled with pain medications.  Denies CP, SOB, N/V,      Vital Signs Last 24 Hrs  T(C): 37 (09 Aug 2023 06:06), Max: 37 (09 Aug 2023 06:06)  T(F): 98.6 (09 Aug 2023 06:06), Max: 98.6 (09 Aug 2023 06:06)  HR: 69 (09 Aug 2023 06:06) (55 - 78)  BP: 100/65 (09 Aug 2023 06:06) (91/61 - 120/77)  BP(mean): --  RR: 18 (09 Aug 2023 06:06) (17 - 18)  SpO2: 93% (09 Aug 2023 06:06) (92% - 98%)    Parameters below as of 09 Aug 2023 06:06  Patient On (Oxygen Delivery Method): nasal cannula  O2 Flow (L/min): 2      General: Pt Alert and oriented, NAD   HV x 1, JPx 1 draining sanguinous fluid  Pulses: 2+ DP bilateral lower extremities  Sensation: SILT distally bilateral lower extremities  Motor:   EHL/FHL/TA/GS: 5/5 bilaterally      LABS:                          12.4   11.06 )-----------( 211      ( 08 Aug 2023 05:30 )             37.2     08-08    138  |  104  |  14  ----------------------------<  128<H>  4.0   |  27  |  0.89    Ca    9.5      08 Aug 2023 05:30          Urinalysis Basic - ( 08 Aug 2023 05:30 )    Color: x / Appearance: x / SG: x / pH: x  Gluc: 128 mg/dL / Ketone: x  / Bili: x / Urobili: x   Blood: x / Protein: x / Nitrite: x   Leuk Esterase: x / RBC: x / WBC x   Sq Epi: x / Non Sq Epi: x / Bacteria: x          A/P: 67yFemale POD #2 s/p L4-L5 TLIF with Dr. Madera  - Pain Control  - Continue with drains  - OOB with meals, encourage incentive spirometry  - DVT ppx: SCDs  - PT, WBS: WBAT   - PT dispo: FIORELLA    Ortho Pager [phone]

## 2023-08-09 NOTE — PROGRESS NOTE ADULT - SUBJECTIVE AND OBJECTIVE BOX
Patient is a 67y old  Female who presents with a chief complaint of lower back pain (09 Aug 2023 10:36)        SUBJECTIVE:  Patient was seen and examined at bedside.    Overnight Events : yesterday blood pressures were slightly low , today she is feeling better , pain is better controlled , she was sitting in the chair and did not have any lightheadedness   shortness of breath also is better  no cough        Review of systems: 12 point Review of systems negative unless otherwise documented elsewhere in note.     Diet, Regular (08-07-23 @ 16:15) [Active]      MEDICATIONS:  MEDICATIONS  (STANDING):  acetaminophen     Tablet .. 1000 milliGRAM(s) Oral every 8 hours  atorvastatin 80 milliGRAM(s) Oral daily  budesonide 160 MICROgram(s)/formoterol 4.5 MICROgram(s) Inhaler 2 Puff(s) Inhalation daily  enoxaparin Injectable 40 milliGRAM(s) SubCutaneous every 24 hours  gabapentin 600 milliGRAM(s) Oral at bedtime  HYDROmorphone  Injectable 0.5 milliGRAM(s) IV Push once  methocarbamol 500 milliGRAM(s) Oral every 8 hours  ondansetron   Disintegrating Tablet 4 milliGRAM(s) Oral every 6 hours    MEDICATIONS  (PRN):  aluminum hydroxide/magnesium hydroxide/simethicone Suspension 30 milliLiter(s) Oral every 4 hours PRN Dyspepsia  benzocaine/menthol Lozenge 1 Lozenge Oral four times a day PRN Sore Throat  melatonin 5 milliGRAM(s) Oral at bedtime PRN Sleep  metoclopramide Injectable 10 milliGRAM(s) IV Push every 8 hours PRN for nausea or vomiting, second line  oxyCODONE    IR 5 milliGRAM(s) Oral every 4 hours PRN Moderate Pain (4 - 6)  oxyCODONE    IR 10 milliGRAM(s) Oral every 4 hours PRN Severe Pain (7 - 10)  senna 2 Tablet(s) Oral two times a day PRN Constipation      Allergies    environmental allergy.....sneezing;  itchy eyes (Other)  lisinopril (Other)    Intolerances        OBJECTIVE:  Vital Signs Last 24 Hrs  T(C): 36.8 (09 Aug 2023 08:17), Max: 37 (09 Aug 2023 06:06)  T(F): 98.3 (09 Aug 2023 08:17), Max: 98.6 (09 Aug 2023 06:06)  HR: 67 (09 Aug 2023 08:17) (67 - 78)  BP: 110/75 (09 Aug 2023 08:17) (96/63 - 110/75)  BP(mean): --  RR: 16 (09 Aug 2023 08:17) (16 - 18)  SpO2: 96% (09 Aug 2023 08:17) (93% - 98%)    Parameters below as of 09 Aug 2023 08:17  Patient On (Oxygen Delivery Method): nasal cannula  O2 Flow (L/min): 2    I&O's Summary    08 Aug 2023 07:01  -  09 Aug 2023 07:00  --------------------------------------------------------  IN: 360 mL / OUT: 3120 mL / NET: -2760 mL    09 Aug 2023 07:01  -  09 Aug 2023 13:15  --------------------------------------------------------  IN: 360 mL / OUT: 750 mL / NET: -390 mL        PHYSICAL EXAM:  Gen: Resting in bed at time of exam, not in distress   HEENT: moist mucosa, no lesions   Neck: supple, trachea at midline  CV: RRR, +S1/S2  Pulm: no wheezing , no crackles  no increase in work of breathing  Abd: soft, NTND  Skin: warm and dry, no new rashes   Ext: moving all 4 extremities spontaneously , no edema  ,  Neuro: AOx3, no gross focal neurological deficits  Psych: affect and behavior appropriate, pleasant at time of interview    LABS:                        11.8   10.37 )-----------( 196      ( 09 Aug 2023 05:30 )             36.2     08-09    139  |  103  |  13  ----------------------------<  97  3.5   |  30  |  0.98    Ca    9.3      09 Aug 2023 05:30          CAPILLARY BLOOD GLUCOSE        Urinalysis Basic - ( 09 Aug 2023 05:30 )    Color: x / Appearance: x / SG: x / pH: x  Gluc: 97 mg/dL / Ketone: x  / Bili: x / Urobili: x   Blood: x / Protein: x / Nitrite: x   Leuk Esterase: x / RBC: x / WBC x   Sq Epi: x / Non Sq Epi: x / Bacteria: x        MICRODATA:      RADIOLOGY/OTHER STUDIES:

## 2023-08-09 NOTE — PROGRESS NOTE ADULT - SUBJECTIVE AND OBJECTIVE BOX
Orthopaedic Surgery Progress Note    Post-operative day #2 s/p L4-5 TLIF    Subjective:     Patient seen and examined. Sitting in chair.  States she is having back pain not fully controlled by pain medication. States she typically takes 600mg gabapentin at bedtime which usually helps her pain at home.      Objective:    Vital Signs Last 24 Hrs  T(C): 36.8 (08-09-23 @ 08:17), Max: 37 (08-09-23 @ 06:06)  T(F): 98.3 (08-09-23 @ 08:17), Max: 98.6 (08-09-23 @ 06:06)  HR: 67 (08-09-23 @ 08:17) (67 - 73)  BP: 110/75 (08-09-23 @ 08:17) (100/65 - 110/75)  BP(mean): --  RR: 16 (08-09-23 @ 08:17) (16 - 18)  SpO2: 96% (08-09-23 @ 08:17) (93% - 96%)  AVSS    PE:  General: Patient alert and oriented, NAD  Dressing: Clean/dry/intact back abd pad/tegaderm  Sensation: intact to bilateral lower extremities   Motor: EHL/FHL/TA/GS 5/5 bilateral lower extremities                           11.8   10.37 )-----------( 196      ( 09 Aug 2023 05:30 )             36.2   08-09    139  |  103  |  13  ----------------------------<  97  3.5   |  30  |  0.98    Ca    9.3      09 Aug 2023 05:30        08-08-23 @ 07:01  -  08-09-23 @ 07:00  --------------------------------------------------------  OUT: 295 mL      A/P: 67yFemale POD#2 s/p   1. Pain control as needed - home dose of gabapentin ordered  2. DVT prophylaxis: lovenox  3. PT, weight-bearing status: WBAT, PT recommends FIORELLA  4. hemovac/LORENZO drain management   5. hypotensive overnight, bolus given, currently asymptomatic

## 2023-08-09 NOTE — PROGRESS NOTE ADULT - ASSESSMENT
67 year old woman admitted to the hospital for an elective TLIF L4-L5        Impression and Plan   # Lumbar Radiculopathy S/p TLIF L4-L5 on 8/7/23   - pain control , DVT prophylaxis , Weightbearing status , Dressing changes and drain care per ortho team    # Acute Hypoxia without signs of respiratory failure   - Secondary to splinting from pain and atelectasis , pain control , incentive spirometry , Encourage OOBTC , wean of O2   - Recommend Outpatient Sleep Study     # Chronic Sinusitis  - Continue Flonase     # HTN   - Hold Losartan while inpatient and discharge  Olmesartan and HCTZ     # HLD   - Atorvastatin     #DVT PPx -    #Dispo - Home     Sterling Alicia   Attending Hospitalist  [phone]

## 2023-08-10 LAB
ANION GAP SERPL CALC-SCNC: 5 MMOL/L — SIGNIFICANT CHANGE UP (ref 5–17)
BASOPHILS # BLD AUTO: 0.05 K/UL — SIGNIFICANT CHANGE UP (ref 0–0.2)
BASOPHILS NFR BLD AUTO: 0.5 % — SIGNIFICANT CHANGE UP (ref 0–2)
BUN SERPL-MCNC: 12 MG/DL — SIGNIFICANT CHANGE UP (ref 7–23)
CALCIUM SERPL-MCNC: 8.8 MG/DL — SIGNIFICANT CHANGE UP (ref 8.4–10.5)
CHLORIDE SERPL-SCNC: 99 MMOL/L — SIGNIFICANT CHANGE UP (ref 96–108)
CO2 SERPL-SCNC: 29 MMOL/L — SIGNIFICANT CHANGE UP (ref 22–31)
CREAT SERPL-MCNC: 1.13 MG/DL — SIGNIFICANT CHANGE UP (ref 0.5–1.3)
EGFR: 54 ML/MIN/1.73M2 — LOW
EOSINOPHIL # BLD AUTO: 0.14 K/UL — SIGNIFICANT CHANGE UP (ref 0–0.5)
EOSINOPHIL NFR BLD AUTO: 1.5 % — SIGNIFICANT CHANGE UP (ref 0–6)
GLUCOSE SERPL-MCNC: 94 MG/DL — SIGNIFICANT CHANGE UP (ref 70–99)
HCT VFR BLD CALC: 34.1 % — LOW (ref 34.5–45)
HGB BLD-MCNC: 11.1 G/DL — LOW (ref 11.5–15.5)
IMM GRANULOCYTES NFR BLD AUTO: 0.3 % — SIGNIFICANT CHANGE UP (ref 0–0.9)
LYMPHOCYTES # BLD AUTO: 4.29 K/UL — HIGH (ref 1–3.3)
LYMPHOCYTES # BLD AUTO: 45.2 % — HIGH (ref 13–44)
MCHC RBC-ENTMCNC: 30.4 PG — SIGNIFICANT CHANGE UP (ref 27–34)
MCHC RBC-ENTMCNC: 32.6 GM/DL — SIGNIFICANT CHANGE UP (ref 32–36)
MCV RBC AUTO: 93.4 FL — SIGNIFICANT CHANGE UP (ref 80–100)
MONOCYTES # BLD AUTO: 1.05 K/UL — HIGH (ref 0–0.9)
MONOCYTES NFR BLD AUTO: 11.1 % — SIGNIFICANT CHANGE UP (ref 2–14)
NEUTROPHILS # BLD AUTO: 3.93 K/UL — SIGNIFICANT CHANGE UP (ref 1.8–7.4)
NEUTROPHILS NFR BLD AUTO: 41.4 % — LOW (ref 43–77)
NRBC # BLD: 0 /100 WBCS — SIGNIFICANT CHANGE UP (ref 0–0)
PLATELET # BLD AUTO: 192 K/UL — SIGNIFICANT CHANGE UP (ref 150–400)
POTASSIUM SERPL-MCNC: 4.1 MMOL/L — SIGNIFICANT CHANGE UP (ref 3.5–5.3)
POTASSIUM SERPL-SCNC: 4.1 MMOL/L — SIGNIFICANT CHANGE UP (ref 3.5–5.3)
RBC # BLD: 3.65 M/UL — LOW (ref 3.8–5.2)
RBC # FLD: 14.1 % — SIGNIFICANT CHANGE UP (ref 10.3–14.5)
SODIUM SERPL-SCNC: 133 MMOL/L — LOW (ref 135–145)
WBC # BLD: 9.49 K/UL — SIGNIFICANT CHANGE UP (ref 3.8–10.5)
WBC # FLD AUTO: 9.49 K/UL — SIGNIFICANT CHANGE UP (ref 3.8–10.5)

## 2023-08-10 PROCEDURE — 99232 SBSQ HOSP IP/OBS MODERATE 35: CPT

## 2023-08-10 PROCEDURE — 93970 EXTREMITY STUDY: CPT | Mod: 26

## 2023-08-10 RX ORDER — LIDOCAINE HCL 20 MG/ML
5 VIAL (ML) INJECTION ONCE
Refills: 0 | Status: DISCONTINUED | OUTPATIENT
Start: 2023-08-10 | End: 2023-08-16

## 2023-08-10 RX ORDER — POLYETHYLENE GLYCOL 3350 17 G/17G
17 POWDER, FOR SOLUTION ORAL DAILY
Refills: 0 | Status: DISCONTINUED | OUTPATIENT
Start: 2023-08-10 | End: 2023-08-11

## 2023-08-10 RX ORDER — HYDROMORPHONE HYDROCHLORIDE 2 MG/ML
4 INJECTION INTRAMUSCULAR; INTRAVENOUS; SUBCUTANEOUS EVERY 4 HOURS
Refills: 0 | Status: DISCONTINUED | OUTPATIENT
Start: 2023-08-10 | End: 2023-08-16

## 2023-08-10 RX ORDER — HYDROMORPHONE HYDROCHLORIDE 2 MG/ML
2 INJECTION INTRAMUSCULAR; INTRAVENOUS; SUBCUTANEOUS EVERY 4 HOURS
Refills: 0 | Status: DISCONTINUED | OUTPATIENT
Start: 2023-08-10 | End: 2023-08-16

## 2023-08-10 RX ORDER — SODIUM CHLORIDE 9 MG/ML
1000 INJECTION, SOLUTION INTRAVENOUS ONCE
Refills: 0 | Status: COMPLETED | OUTPATIENT
Start: 2023-08-10 | End: 2023-08-10

## 2023-08-10 RX ORDER — TRIAMCINOLONE 4 MG
40 TABLET ORAL ONCE
Refills: 0 | Status: DISCONTINUED | OUTPATIENT
Start: 2023-08-10 | End: 2023-08-16

## 2023-08-10 RX ADMIN — HYDROMORPHONE HYDROCHLORIDE 0.5 MILLIGRAM(S): 2 INJECTION INTRAMUSCULAR; INTRAVENOUS; SUBCUTANEOUS at 21:46

## 2023-08-10 RX ADMIN — Medication 1000 MILLIGRAM(S): at 21:46

## 2023-08-10 RX ADMIN — Medication 1000 MILLIGRAM(S): at 06:50

## 2023-08-10 RX ADMIN — HYDROMORPHONE HYDROCHLORIDE 2 MILLIGRAM(S): 2 INJECTION INTRAMUSCULAR; INTRAVENOUS; SUBCUTANEOUS at 20:30

## 2023-08-10 RX ADMIN — Medication 1000 MILLIGRAM(S): at 13:01

## 2023-08-10 RX ADMIN — HYDROMORPHONE HYDROCHLORIDE 2 MILLIGRAM(S): 2 INJECTION INTRAMUSCULAR; INTRAVENOUS; SUBCUTANEOUS at 11:36

## 2023-08-10 RX ADMIN — BUDESONIDE AND FORMOTEROL FUMARATE DIHYDRATE 2 PUFF(S): 160; 4.5 AEROSOL RESPIRATORY (INHALATION) at 17:21

## 2023-08-10 RX ADMIN — ATORVASTATIN CALCIUM 80 MILLIGRAM(S): 80 TABLET, FILM COATED ORAL at 21:46

## 2023-08-10 RX ADMIN — ENOXAPARIN SODIUM 40 MILLIGRAM(S): 100 INJECTION SUBCUTANEOUS at 21:46

## 2023-08-10 RX ADMIN — HYDROMORPHONE HYDROCHLORIDE 0.5 MILLIGRAM(S): 2 INJECTION INTRAMUSCULAR; INTRAVENOUS; SUBCUTANEOUS at 22:06

## 2023-08-10 RX ADMIN — HYDROMORPHONE HYDROCHLORIDE 2 MILLIGRAM(S): 2 INJECTION INTRAMUSCULAR; INTRAVENOUS; SUBCUTANEOUS at 16:36

## 2023-08-10 RX ADMIN — HYDROMORPHONE HYDROCHLORIDE 2 MILLIGRAM(S): 2 INJECTION INTRAMUSCULAR; INTRAVENOUS; SUBCUTANEOUS at 15:36

## 2023-08-10 RX ADMIN — SODIUM CHLORIDE 1000 MILLILITER(S): 9 INJECTION, SOLUTION INTRAVENOUS at 02:14

## 2023-08-10 RX ADMIN — SENNA PLUS 2 TABLET(S): 8.6 TABLET ORAL at 17:21

## 2023-08-10 RX ADMIN — HYDROMORPHONE HYDROCHLORIDE 2 MILLIGRAM(S): 2 INJECTION INTRAMUSCULAR; INTRAVENOUS; SUBCUTANEOUS at 12:36

## 2023-08-10 RX ADMIN — HYDROMORPHONE HYDROCHLORIDE 2 MILLIGRAM(S): 2 INJECTION INTRAMUSCULAR; INTRAVENOUS; SUBCUTANEOUS at 19:38

## 2023-08-10 RX ADMIN — Medication 1000 MILLIGRAM(S): at 14:01

## 2023-08-10 RX ADMIN — METHOCARBAMOL 500 MILLIGRAM(S): 500 TABLET, FILM COATED ORAL at 06:55

## 2023-08-10 RX ADMIN — Medication 5 MILLIGRAM(S): at 21:53

## 2023-08-10 RX ADMIN — GABAPENTIN 600 MILLIGRAM(S): 400 CAPSULE ORAL at 21:47

## 2023-08-10 NOTE — PROGRESS NOTE ADULT - SUBJECTIVE AND OBJECTIVE BOX
Ortho Note    Patient seen and examined at bedside. Pt endorsing incisional back pain and pain in her right knee. She reports she has a history of right knee osteoarthritis and has found it difficult to ambulate because of the pain. Additionally, she reports she has severe right calf pain this morning. Her blood pressure was low overnight but states her dizziness has since resolved this morning.   Denies CP, SOB, N/V, new numbness/tingling     Vital Signs Last 24 Hrs  T(C): 36.6 (08-10-23 @ 08:11), Max: 36.6 (08-10-23 @ 08:11)  T(F): 97.9 (08-10-23 @ 08:11), Max: 97.9 (08-10-23 @ 08:11)  HR: 87 (08-10-23 @ 08:11) (82 - 87)  BP: 122/81 (08-10-23 @ 08:11) (100/67 - 122/81)  BP(mean): --  RR: 16 (08-10-23 @ 08:11) (16 - 16)  SpO2: 95% (08-10-23 @ 08:11) (95% - 95%)  I&O's Summary    09 Aug 2023 07:01  -  10 Aug 2023 07:00  --------------------------------------------------------  IN: 360 mL / OUT: 2145 mL / NET: -1785 mL        General: Pt Alert and oriented, NAD  DSG C/D/I- Telfa and tegaderm to lumbar spine, HVx1 holding suction, JPx1  Pulses: 2+ DP pulses palpable bilaterally, skin wwp, cap refill brisk. (+) right calf tenderness, no erythema, warmth, or swelling noted. No tenderness to left calf  Sensation: SILT to L2-S1 dermatomes bilaterally  Motor: EHL/FHL/TA/GS 5/5 bilaterally                          11.1   9.49  )-----------( 192      ( 10 Aug 2023 05:30 )             34.1     08-10    133<L>  |  99  |  12  ----------------------------<  94  4.1   |  29  |  1.13    Ca    8.8      10 Aug 2023 05:30        A/P: 66yo Female POD#3 s/p L4-5 TLIF   - Stable, appreciate medical comanagement   - Hypotensive overnight 73/50, 1L bolus was given. BP has since normalized, most recent /81  - Na 133 - encourage po intake, trend BMP  - Right calf pain: Bilateral LE doppler to r/o DVT  - Right knee OA: if LE doppler negative, will administer steroid injection   - Pain Control  - OOB/IS  - Bowel Regimen  - HV: 120/200, LORENZO: 35/75 - continue to monitor output  - DVT ppx: LVX 40mg daily  - PT, WBS: WBAT    Ortho Pager [phone] Ortho Note    Patient seen and examined at bedside. Pt endorsing incisional back pain and pain in her right knee. She reports she has a history of right knee osteoarthritis and has found it difficult to ambulate because of the pain. Additionally, she reports she has severe right calf pain this morning. Her blood pressure was low overnight but states her dizziness has since resolved this morning.   Denies CP, SOB, N/V, new numbness/tingling     Vital Signs Last 24 Hrs  T(C): 36.6 (08-10-23 @ 08:11), Max: 36.6 (08-10-23 @ 08:11)  T(F): 97.9 (08-10-23 @ 08:11), Max: 97.9 (08-10-23 @ 08:11)  HR: 87 (08-10-23 @ 08:11) (82 - 87)  BP: 122/81 (08-10-23 @ 08:11) (100/67 - 122/81)  BP(mean): --  RR: 16 (08-10-23 @ 08:11) (16 - 16)  SpO2: 95% (08-10-23 @ 08:11) (95% - 95%)  I&O's Summary    09 Aug 2023 07:01  -  10 Aug 2023 07:00  --------------------------------------------------------  IN: 360 mL / OUT: 2145 mL / NET: -1785 mL        General: Pt Alert and oriented, NAD  DSG C/D/I- Telfa and tegaderm to lumbar spine, HVx1 holding suction, JPx1  Pulses: 2+ DP pulses palpable bilaterally, skin wwp, cap refill brisk. (+) right calf tenderness, no erythema, warmth, or swelling noted. No tenderness to left calf  Sensation: SILT to L2-S1 dermatomes bilaterally  Motor: EHL/FHL/TA/GS 5/5 bilaterally                          11.1   9.49  )-----------( 192      ( 10 Aug 2023 05:30 )             34.1     08-10    133<L>  |  99  |  12  ----------------------------<  94  4.1   |  29  |  1.13    Ca    8.8      10 Aug 2023 05:30        A/P: 68yo Female POD#3 s/p L4-5 TLIF   - Stable, appreciate medical comanagement   - Hypotensive overnight 73/50, 1L bolus was given. BP has since normalized, most recent /81  - Na 133 - encourage po intake, trend BMP  - Right calf pain: Bilateral LE doppler to r/o DVT  - Right knee OA: if LE doppler negative, will administer steroid injection   - Pain Control  - OOB/IS  - Bowel Regimen  - HV: 120/200, LORENZO: 35/75 - continue to monitor output  - DVT ppx: LVX 40mg daily  - PT, WBS: WBAT  - Dispo: [facility]A    Ortho Pager [phone]

## 2023-08-10 NOTE — PROGRESS NOTE ADULT - SUBJECTIVE AND OBJECTIVE BOX
Orthopaedic Surgery Progress Note    Post-operative day #3 s/p L4-5 TLIF    Subjective:     Patient seen and examined. Hypotensive this morning. Given fluid bolus. Endorses dizziness and lightheadedness Pain controlled with gabapentin home dose      Vital Signs Last 24 Hrs  T(C): 36.5 (10 Aug 2023 04:49), Max: 37.2 (09 Aug 2023 16:46)  T(F): 97.7 (10 Aug 2023 04:49), Max: 98.9 (09 Aug 2023 16:46)  HR: 80 (10 Aug 2023 04:49) (61 - 88)  BP: 89/57 (10 Aug 2023 04:49) (73/50 - 121/81)  BP(mean): --  RR: 17 (10 Aug 2023 04:49) (16 - 18)  SpO2: 94% (10 Aug 2023 04:49) (91% - 98%)    Parameters below as of 10 Aug 2023 04:49  Patient On (Oxygen Delivery Method): nasal cannula  O2 Flow (L/min): 2      PE:  General: Patient alert and oriented, NAD  Dressing: Clean/dry/intact back abd pad/tegaderm  Sensation: intact to bilateral lower extremities   Motor: EHL/FHL/TA/GS 5/5 bilateral lower extremities   HVx1, LORENZO x 1      LABS:                          11.8   10.37 )-----------( 196      ( 09 Aug 2023 05:30 )             36.2     08-09    139  |  103  |  13  ----------------------------<  97  3.5   |  30  |  0.98    Ca    9.3      09 Aug 2023 05:30          Urinalysis Basic - ( 09 Aug 2023 05:30 )    Color: x / Appearance: x / SG: x / pH: x  Gluc: 97 mg/dL / Ketone: x  / Bili: x / Urobili: x   Blood: x / Protein: x / Nitrite: x   Leuk Esterase: x / RBC: x / WBC x   Sq Epi: x / Non Sq Epi: x / Bacteria: x            A/P: 67yFemale POD#3 s/p above procedure  1. Pain control as needed   2. DVT prophylaxis: lovenox  3. PT, weight-bearing status: WBAT, PT recommends FIORELLA  4. hemovac/LORENZO drain management   5. hypotensive this am, bolus given

## 2023-08-10 NOTE — PROGRESS NOTE ADULT - ASSESSMENT
67 year old woman admitted to the hospital for an elective TLIF L4-L5        Impression and Plan   # Lumbar Radiculopathy S/p TLIF L4-L5 on 8/7/23   - pain control , DVT prophylaxis , Weightbearing status , Dressing changes and drain care per ortho team    # Acute Hypoxia without signs of respiratory failure   - Secondary to splinting from pain and atelectasis , pain control , incentive spirometry , Encourage OOBTC , wean of O2   - Recommend Outpatient Sleep Study     # Chronic Sinusitis  - Continue Flonase     # HTN   - Hold Losartan while inpatient and discharge  Olmesartan and HCTZ     # HLD   - Atorvastatin     #DVT PPx -    #Dispo - [facility]LLA

## 2023-08-10 NOTE — PROGRESS NOTE ADULT - SUBJECTIVE AND OBJECTIVE BOX
Patient is a 67y old  Female who presents with a chief complaint of lower back pain (09 Aug 2023 10:36)        SUBJECTIVE:  Patient was seen and examined at bedside.    Overnight Events : yesterday blood pressures were slightly low , today she is feeling better , pain is better controlled , she was sitting in the chair and did not have any lightheadedness   shortness of breath also is better  no cough        Review of systems: 12 point Review of systems negative unless otherwise documented elsewhere in note.     Diet, Regular (08-07-23 @ 16:15) [Active]      MEDICATIONS:  MEDICATIONS  (STANDING):  acetaminophen     Tablet .. 1000 milliGRAM(s) Oral every 8 hours  atorvastatin 80 milliGRAM(s) Oral daily  budesonide 160 MICROgram(s)/formoterol 4.5 MICROgram(s) Inhaler 2 Puff(s) Inhalation daily  enoxaparin Injectable 40 milliGRAM(s) SubCutaneous every 24 hours  gabapentin 600 milliGRAM(s) Oral at bedtime  HYDROmorphone  Injectable 0.5 milliGRAM(s) IV Push once  methocarbamol 500 milliGRAM(s) Oral every 8 hours  ondansetron   Disintegrating Tablet 4 milliGRAM(s) Oral every 6 hours    MEDICATIONS  (PRN):  aluminum hydroxide/magnesium hydroxide/simethicone Suspension 30 milliLiter(s) Oral every 4 hours PRN Dyspepsia  benzocaine/menthol Lozenge 1 Lozenge Oral four times a day PRN Sore Throat  melatonin 5 milliGRAM(s) Oral at bedtime PRN Sleep  metoclopramide Injectable 10 milliGRAM(s) IV Push every 8 hours PRN for nausea or vomiting, second line  oxyCODONE    IR 5 milliGRAM(s) Oral every 4 hours PRN Moderate Pain (4 - 6)  oxyCODONE    IR 10 milliGRAM(s) Oral every 4 hours PRN Severe Pain (7 - 10)  senna 2 Tablet(s) Oral two times a day PRN Constipation      Allergies    environmental allergy.....sneezing;  itchy eyes (Other)  lisinopril (Other)    Intolerances        OBJECTIVE:  Vital Signs Last 24 Hrs  T(C): 36.6 (10 Aug 2023 08:11), Max: 37.2 (09 Aug 2023 16:46)  T(F): 97.9 (10 Aug 2023 08:11), Max: 98.9 (09 Aug 2023 16:46)  HR: 87 (10 Aug 2023 08:11) (61 - 88)  BP: 122/81 (10 Aug 2023 08:11) (73/50 - 122/81)  BP(mean): --  RR: 16 (10 Aug 2023 08:11) (16 - 18)  SpO2: 95% (10 Aug 2023 08:11) (91% - 98%)    Parameters below as of 10 Aug 2023 08:11  Patient On (Oxygen Delivery Method): nasal cannula  O2 Flow (L/min): 2          PHYSICAL EXAM:  Gen: Resting in bed at time of exam, not in distress   HEENT: moist mucosa, no lesions   Neck: supple, trachea at midline  CV: RRR, +S1/S2  Pulm: no wheezing , no crackles  no increase in work of breathing  Abd: soft, NTND  Skin: warm and dry, no new rashes   Ext: moving all 4 extremities spontaneously , no edema  ,  Neuro: AOx3, no gross focal neurological deficits  Psych: affect and behavior appropriate, pleasant at time of interview    LABS:                                   11.1   9.49  )-----------( 192      ( 10 Aug 2023 05:30 )             34.1     08-10    133<L>  |  99  |  12  ----------------------------<  94  4.1   |  29  |  1.13    Ca    8.8      10 Aug 2023 05:30    x        MICRODATA:      RADIOLOGY/OTHER STUDIES:

## 2023-08-11 LAB
ANION GAP SERPL CALC-SCNC: 4 MMOL/L — LOW (ref 5–17)
BUN SERPL-MCNC: 8 MG/DL — SIGNIFICANT CHANGE UP (ref 7–23)
CALCIUM SERPL-MCNC: 9.1 MG/DL — SIGNIFICANT CHANGE UP (ref 8.4–10.5)
CHLORIDE SERPL-SCNC: 100 MMOL/L — SIGNIFICANT CHANGE UP (ref 96–108)
CO2 SERPL-SCNC: 31 MMOL/L — SIGNIFICANT CHANGE UP (ref 22–31)
CREAT SERPL-MCNC: 0.8 MG/DL — SIGNIFICANT CHANGE UP (ref 0.5–1.3)
EGFR: 81 ML/MIN/1.73M2 — SIGNIFICANT CHANGE UP
GLUCOSE SERPL-MCNC: 118 MG/DL — HIGH (ref 70–99)
HCT VFR BLD CALC: 34.2 % — LOW (ref 34.5–45)
HGB BLD-MCNC: 11.2 G/DL — LOW (ref 11.5–15.5)
MCHC RBC-ENTMCNC: 30.8 PG — SIGNIFICANT CHANGE UP (ref 27–34)
MCHC RBC-ENTMCNC: 32.7 GM/DL — SIGNIFICANT CHANGE UP (ref 32–36)
MCV RBC AUTO: 94 FL — SIGNIFICANT CHANGE UP (ref 80–100)
NRBC # BLD: 0 /100 WBCS — SIGNIFICANT CHANGE UP (ref 0–0)
PLATELET # BLD AUTO: 206 K/UL — SIGNIFICANT CHANGE UP (ref 150–400)
POTASSIUM SERPL-MCNC: 4.4 MMOL/L — SIGNIFICANT CHANGE UP (ref 3.5–5.3)
POTASSIUM SERPL-SCNC: 4.4 MMOL/L — SIGNIFICANT CHANGE UP (ref 3.5–5.3)
RBC # BLD: 3.64 M/UL — LOW (ref 3.8–5.2)
RBC # FLD: 13.6 % — SIGNIFICANT CHANGE UP (ref 10.3–14.5)
SODIUM SERPL-SCNC: 135 MMOL/L — SIGNIFICANT CHANGE UP (ref 135–145)
WBC # BLD: 10.56 K/UL — HIGH (ref 3.8–10.5)
WBC # FLD AUTO: 10.56 K/UL — HIGH (ref 3.8–10.5)

## 2023-08-11 PROCEDURE — 73560 X-RAY EXAM OF KNEE 1 OR 2: CPT | Mod: 26,RT

## 2023-08-11 PROCEDURE — 72100 X-RAY EXAM L-S SPINE 2/3 VWS: CPT | Mod: 26

## 2023-08-11 PROCEDURE — 99232 SBSQ HOSP IP/OBS MODERATE 35: CPT

## 2023-08-11 RX ORDER — LACTULOSE 10 G/15ML
10 SOLUTION ORAL THREE TIMES A DAY
Refills: 0 | Status: DISCONTINUED | OUTPATIENT
Start: 2023-08-11 | End: 2023-08-16

## 2023-08-11 RX ORDER — POLYETHYLENE GLYCOL 3350 17 G/17G
17 POWDER, FOR SOLUTION ORAL DAILY
Refills: 0 | Status: DISCONTINUED | OUTPATIENT
Start: 2023-08-11 | End: 2023-08-16

## 2023-08-11 RX ORDER — HYDROMORPHONE HYDROCHLORIDE 2 MG/ML
0.2 INJECTION INTRAMUSCULAR; INTRAVENOUS; SUBCUTANEOUS ONCE
Refills: 0 | Status: DISCONTINUED | OUTPATIENT
Start: 2023-08-11 | End: 2023-08-11

## 2023-08-11 RX ORDER — DEXAMETHASONE 0.5 MG/5ML
6 ELIXIR ORAL EVERY 6 HOURS
Refills: 0 | Status: COMPLETED | OUTPATIENT
Start: 2023-08-11 | End: 2023-08-12

## 2023-08-11 RX ORDER — SENNA PLUS 8.6 MG/1
2 TABLET ORAL AT BEDTIME
Refills: 0 | Status: DISCONTINUED | OUTPATIENT
Start: 2023-08-11 | End: 2023-08-16

## 2023-08-11 RX ADMIN — HYDROMORPHONE HYDROCHLORIDE 2 MILLIGRAM(S): 2 INJECTION INTRAMUSCULAR; INTRAVENOUS; SUBCUTANEOUS at 09:46

## 2023-08-11 RX ADMIN — Medication 6 MILLIGRAM(S): at 23:44

## 2023-08-11 RX ADMIN — Medication 1000 MILLIGRAM(S): at 14:18

## 2023-08-11 RX ADMIN — ATORVASTATIN CALCIUM 80 MILLIGRAM(S): 80 TABLET, FILM COATED ORAL at 21:19

## 2023-08-11 RX ADMIN — HYDROMORPHONE HYDROCHLORIDE 0.2 MILLIGRAM(S): 2 INJECTION INTRAMUSCULAR; INTRAVENOUS; SUBCUTANEOUS at 06:31

## 2023-08-11 RX ADMIN — Medication 6 MILLIGRAM(S): at 17:46

## 2023-08-11 RX ADMIN — LACTULOSE 10 GRAM(S): 10 SOLUTION ORAL at 19:15

## 2023-08-11 RX ADMIN — HYDROMORPHONE HYDROCHLORIDE 4 MILLIGRAM(S): 2 INJECTION INTRAMUSCULAR; INTRAVENOUS; SUBCUTANEOUS at 12:45

## 2023-08-11 RX ADMIN — HYDROMORPHONE HYDROCHLORIDE 2 MILLIGRAM(S): 2 INJECTION INTRAMUSCULAR; INTRAVENOUS; SUBCUTANEOUS at 08:46

## 2023-08-11 RX ADMIN — BUDESONIDE AND FORMOTEROL FUMARATE DIHYDRATE 2 PUFF(S): 160; 4.5 AEROSOL RESPIRATORY (INHALATION) at 12:44

## 2023-08-11 RX ADMIN — Medication 5 MILLIGRAM(S): at 22:33

## 2023-08-11 RX ADMIN — HYDROMORPHONE HYDROCHLORIDE 2 MILLIGRAM(S): 2 INJECTION INTRAMUSCULAR; INTRAVENOUS; SUBCUTANEOUS at 04:47

## 2023-08-11 RX ADMIN — GABAPENTIN 600 MILLIGRAM(S): 400 CAPSULE ORAL at 21:19

## 2023-08-11 RX ADMIN — Medication 1000 MILLIGRAM(S): at 21:20

## 2023-08-11 RX ADMIN — HYDROMORPHONE HYDROCHLORIDE 2 MILLIGRAM(S): 2 INJECTION INTRAMUSCULAR; INTRAVENOUS; SUBCUTANEOUS at 04:27

## 2023-08-11 RX ADMIN — Medication 1000 MILLIGRAM(S): at 06:15

## 2023-08-11 RX ADMIN — HYDROMORPHONE HYDROCHLORIDE 4 MILLIGRAM(S): 2 INJECTION INTRAMUSCULAR; INTRAVENOUS; SUBCUTANEOUS at 23:33

## 2023-08-11 RX ADMIN — HYDROMORPHONE HYDROCHLORIDE 0.2 MILLIGRAM(S): 2 INJECTION INTRAMUSCULAR; INTRAVENOUS; SUBCUTANEOUS at 14:57

## 2023-08-11 RX ADMIN — SENNA PLUS 2 TABLET(S): 8.6 TABLET ORAL at 21:20

## 2023-08-11 RX ADMIN — HYDROMORPHONE HYDROCHLORIDE 0.2 MILLIGRAM(S): 2 INJECTION INTRAMUSCULAR; INTRAVENOUS; SUBCUTANEOUS at 15:13

## 2023-08-11 RX ADMIN — ENOXAPARIN SODIUM 40 MILLIGRAM(S): 100 INJECTION SUBCUTANEOUS at 21:19

## 2023-08-11 RX ADMIN — HYDROMORPHONE HYDROCHLORIDE 4 MILLIGRAM(S): 2 INJECTION INTRAMUSCULAR; INTRAVENOUS; SUBCUTANEOUS at 13:45

## 2023-08-11 RX ADMIN — POLYETHYLENE GLYCOL 3350 17 GRAM(S): 17 POWDER, FOR SOLUTION ORAL at 12:44

## 2023-08-11 RX ADMIN — Medication 1000 MILLIGRAM(S): at 22:20

## 2023-08-11 RX ADMIN — HYDROMORPHONE HYDROCHLORIDE 4 MILLIGRAM(S): 2 INJECTION INTRAMUSCULAR; INTRAVENOUS; SUBCUTANEOUS at 22:33

## 2023-08-11 NOTE — PROGRESS NOTE ADULT - SUBJECTIVE AND OBJECTIVE BOX
Patient is a 67y old  Female who presents with a chief complaint of lower back pain (09 Aug 2023 10:36)        SUBJECTIVE:  Patient was seen and examined at bedside.    Overnight Events :   Feeling better   pain well controlled   spending more time in the chair         Review of systems: 12 point Review of systems negative unless otherwise documented elsewhere in note.     Diet, Regular (08-07-23 @ 16:15) [Active]      MEDICATIONS:  MEDICATIONS  (STANDING):  acetaminophen     Tablet .. 1000 milliGRAM(s) Oral every 8 hours  atorvastatin 80 milliGRAM(s) Oral daily  budesonide 160 MICROgram(s)/formoterol 4.5 MICROgram(s) Inhaler 2 Puff(s) Inhalation daily  enoxaparin Injectable 40 milliGRAM(s) SubCutaneous every 24 hours  gabapentin 600 milliGRAM(s) Oral at bedtime  HYDROmorphone  Injectable 0.5 milliGRAM(s) IV Push once  methocarbamol 500 milliGRAM(s) Oral every 8 hours  ondansetron   Disintegrating Tablet 4 milliGRAM(s) Oral every 6 hours    MEDICATIONS  (PRN):  aluminum hydroxide/magnesium hydroxide/simethicone Suspension 30 milliLiter(s) Oral every 4 hours PRN Dyspepsia  benzocaine/menthol Lozenge 1 Lozenge Oral four times a day PRN Sore Throat  melatonin 5 milliGRAM(s) Oral at bedtime PRN Sleep  metoclopramide Injectable 10 milliGRAM(s) IV Push every 8 hours PRN for nausea or vomiting, second line  oxyCODONE    IR 5 milliGRAM(s) Oral every 4 hours PRN Moderate Pain (4 - 6)  oxyCODONE    IR 10 milliGRAM(s) Oral every 4 hours PRN Severe Pain (7 - 10)  senna 2 Tablet(s) Oral two times a day PRN Constipation      Allergies    environmental allergy.....sneezing;  itchy eyes (Other)  lisinopril (Other)    Intolerances        OBJECTIVE:  Vital Signs Last 24 Hrs  T(C): 37.2 (11 Aug 2023 08:30), Max: 37.2 (11 Aug 2023 08:30)  T(F): 99 (11 Aug 2023 08:30), Max: 99 (11 Aug 2023 08:30)  HR: 98 (11 Aug 2023 08:30) (89 - 98)  BP: 107/72 (11 Aug 2023 08:30) (107/72 - 117/73)  BP(mean): --  RR: 15 (11 Aug 2023 08:30) (15 - 18)  SpO2: 94% (11 Aug 2023 08:30) (91% - 94%)    Parameters below as of 11 Aug 2023 08:30  Patient On (Oxygen Delivery Method): room air              PHYSICAL EXAM:  Gen: Resting in bed at time of exam, not in distress   HEENT: moist mucosa, no lesions   Neck: supple, trachea at midline  CV: RRR, +S1/S2  Pulm: no wheezing , no crackles  no increase in work of breathing  Abd: soft, NTND  Skin: warm and dry, no new rashes   Ext: moving all 4 extremities spontaneously , no edema  ,  Neuro: AOx3, no gross focal neurological deficits  Psych: affect and behavior appropriate, pleasant at time of interview    LABS:                                              11.2   10.56 )-----------( 206      ( 11 Aug 2023 10:31 )             34.2     08-11    135  |  100  |  8   ----------------------------<  118<H>  4.4   |  31  |  0.80    Ca    9.1      11 Aug 2023 10:31            MICRODATA:      RADIOLOGY/OTHER STUDIES:

## 2023-08-11 NOTE — PROGRESS NOTE ADULT - SUBJECTIVE AND OBJECTIVE BOX
Ortho Note    Pt seen and examined at bedside.  Pt resting comfortably in bed without complaints, pain is better controlled with IV dilaudid, and working on transition onto oral dilaudid. She is endorsing pain to the anterior-lateral side of her right leg. Pt had a doppler done yesterday and she is aware that the report is negative. She is getting her standing XRs today of her knee and spine. She says that she has been getting up out of bed to chair and denies any dizziness of lightheaded feeling today.   Denies CP, SOB, N/V, numbness/tingling     Vital Signs Last 24 Hrs  T(C): 37.2 (08-11-23 @ 08:30), Max: 37.2 (08-11-23 @ 08:30)  T(F): 99 (08-11-23 @ 08:30), Max: 99 (08-11-23 @ 08:30)  HR: 98 (08-11-23 @ 08:30) (98 - 98)  BP: 107/72 (08-11-23 @ 08:30) (107/72 - 107/72)  BP(mean): --  RR: 15 (08-11-23 @ 08:30) (15 - 15)  SpO2: 94% (08-11-23 @ 08:30) (94% - 94%)  I&O's Summary    10 Aug 2023 07:01  -  11 Aug 2023 07:00  --------------------------------------------------------  IN: 0 mL / OUT: 505 mL / NET: -505 mL    11 Aug 2023 07:01  -  11 Aug 2023 12:29  --------------------------------------------------------  IN: 0 mL / OUT: 200 mL / NET: -200 mL    PE  General: Pt Alert and oriented, NAD  DSG: telfa and tegaderm to lumbar spine  HV x 1 holding suction and LORENZO x 1 holding suction   Pulses: +2 DP palpable pulses   Sensation: SILT to L2-S1 dermatomes bilaterally   Motor: EHL/FHL/TA/GS 5/5 bilaterally                         11.2   10.56 )-----------( 206      ( 11 Aug 2023 10:31 )             34.2     08-11    135  |  100  |  8   ----------------------------<  118<H>  4.4   |  31  |  0.80    Ca    9.1      11 Aug 2023 10:31    A/P: 67yFemale POD#4 s/p L4-5 TLIF  - Stable  - Medicine co-management recommendations appreciated  - Pain Control  - Bowel regimen: senna at bedtime and miralax 17g qd; lactulose 10mg tid prn   - DVT ppx: LVX 40mg daily   - PT, WBS: WBAT, OOB  - HV: LORENZO: continue to monitor output and check 2PM dose  - Right calf pain: bilateral LE doppler negative for DVT  - Right knee OA: LE doppler negative on 8/10, will get stand R knee XR today (8/11) to evaluation for possible steroid injection   - Plan for standing XR of lumbar spine today (8/11)  DISPO: FIORELLA [place] Pager [phone]

## 2023-08-11 NOTE — PROGRESS NOTE ADULT - ASSESSMENT
67 year old woman admitted to the hospital for an elective TLIF L4-L5        Impression and Plan   # Lumbar Radiculopathy S/p TLIF L4-L5 on 8/7/23   - pain control , DVT prophylaxis , Weightbearing status , Dressing changes and drain care per ortho team    # Acute Hypoxia without signs of respiratory failure   - Secondary to splinting from pain and atelectasis , pain control , incentive spirometry , Encourage OOBTC , O2 weaned off on 8/11 , current spo2 92-94% on room air   - Recommend Outpatient Sleep Study     # Chronic Sinusitis  - Continue Flonase     # HTN   - Hold Losartan while inpatient and discharge  Olmesartan and HCTZ     # HLD   - Atorvastatin     #DVT PPx -    #Dispo - [facility]A

## 2023-08-11 NOTE — PROGRESS NOTE ADULT - SUBJECTIVE AND OBJECTIVE BOX
Ortho Note    Patient seen and examined at bedside. Pt endorsing RLE pain in her hip and knee.  Denies CP, SOB, N/V, new numbness/tingling       Vital Signs Last 24 Hrs  T(C): 37.1 (11 Aug 2023 05:22), Max: 37.1 (11 Aug 2023 05:22)  T(F): 98.8 (11 Aug 2023 05:22), Max: 98.8 (11 Aug 2023 05:22)  HR: 89 (11 Aug 2023 05:22) (82 - 95)  BP: 112/74 (11 Aug 2023 05:22) (100/67 - 122/81)  BP(mean): --  RR: 18 (11 Aug 2023 05:22) (16 - 18)  SpO2: 91% (11 Aug 2023 05:22) (91% - 95%)    Parameters below as of 11 Aug 2023 05:22  Patient On (Oxygen Delivery Method): room air            General: Pt Alert and oriented, NAD  DSG C/D/I- Telfa and tegaderm to lumbar spine, HVx1 holding suction, JPx1  Pulses: 2+ DP pulses palpable bilaterally, skin wwp, cap refill brisk. (+) right calf tenderness, no erythema, warmth, or swelling noted. No tenderness to left calf  Sensation: SILT to L2-S1 dermatomes bilaterally  Motor: EHL/FHL/TA/GS 5/5 bilaterally               LABS:                          11.1   9.49  )-----------( 192      ( 10 Aug 2023 05:30 )             34.1     08-10    133<L>  |  99  |  12  ----------------------------<  94  4.1   |  29  |  1.13    Ca    8.8      10 Aug 2023 05:30          Urinalysis Basic - ( 10 Aug 2023 05:30 )    Color: x / Appearance: x / SG: x / pH: x  Gluc: 94 mg/dL / Ketone: x  / Bili: x / Urobili: x   Blood: x / Protein: x / Nitrite: x   Leuk Esterase: x / RBC: x / WBC x   Sq Epi: x / Non Sq Epi: x / Bacteria: x            A/P: 66yo Female POD#4 s/p L4-5 TLIF   - Stable, appreciate medical comanagement   - Doppler BLE negative  - F/u XR Back and R Knee  - Steroid injection   - Pain Control  - OOB/IS  - Bowel Regimen  - HV: continue to monitor output  - DVT ppx: LVX 40mg daily  - PT, WBS: WBAT  - Dispo: FIORELLA    Ortho Pager [phone]

## 2023-08-12 LAB
A1C WITH ESTIMATED AVERAGE GLUCOSE RESULT: 5.8 % — HIGH (ref 4–5.6)
ANION GAP SERPL CALC-SCNC: 9 MMOL/L — SIGNIFICANT CHANGE UP (ref 5–17)
BUN SERPL-MCNC: 12 MG/DL — SIGNIFICANT CHANGE UP (ref 7–23)
CALCIUM SERPL-MCNC: 9.8 MG/DL — SIGNIFICANT CHANGE UP (ref 8.4–10.5)
CHLORIDE SERPL-SCNC: 101 MMOL/L — SIGNIFICANT CHANGE UP (ref 96–108)
CO2 SERPL-SCNC: 24 MMOL/L — SIGNIFICANT CHANGE UP (ref 22–31)
CREAT SERPL-MCNC: 0.64 MG/DL — SIGNIFICANT CHANGE UP (ref 0.5–1.3)
EGFR: 97 ML/MIN/1.73M2 — SIGNIFICANT CHANGE UP
ESTIMATED AVERAGE GLUCOSE: 120 MG/DL — HIGH (ref 68–114)
GLUCOSE SERPL-MCNC: 223 MG/DL — HIGH (ref 70–99)
HCT VFR BLD CALC: 36 % — SIGNIFICANT CHANGE UP (ref 34.5–45)
HGB BLD-MCNC: 11.7 G/DL — SIGNIFICANT CHANGE UP (ref 11.5–15.5)
MCHC RBC-ENTMCNC: 30.2 PG — SIGNIFICANT CHANGE UP (ref 27–34)
MCHC RBC-ENTMCNC: 32.5 GM/DL — SIGNIFICANT CHANGE UP (ref 32–36)
MCV RBC AUTO: 93 FL — SIGNIFICANT CHANGE UP (ref 80–100)
NRBC # BLD: 0 /100 WBCS — SIGNIFICANT CHANGE UP (ref 0–0)
PLATELET # BLD AUTO: 243 K/UL — SIGNIFICANT CHANGE UP (ref 150–400)
POTASSIUM SERPL-MCNC: 4.5 MMOL/L — SIGNIFICANT CHANGE UP (ref 3.5–5.3)
POTASSIUM SERPL-SCNC: 4.5 MMOL/L — SIGNIFICANT CHANGE UP (ref 3.5–5.3)
RBC # BLD: 3.87 M/UL — SIGNIFICANT CHANGE UP (ref 3.8–5.2)
RBC # FLD: 13.8 % — SIGNIFICANT CHANGE UP (ref 10.3–14.5)
SODIUM SERPL-SCNC: 134 MMOL/L — LOW (ref 135–145)
WBC # BLD: 10.97 K/UL — HIGH (ref 3.8–10.5)
WBC # FLD AUTO: 10.97 K/UL — HIGH (ref 3.8–10.5)

## 2023-08-12 PROCEDURE — 99232 SBSQ HOSP IP/OBS MODERATE 35: CPT

## 2023-08-12 RX ORDER — LOSARTAN POTASSIUM 100 MG/1
50 TABLET, FILM COATED ORAL DAILY
Refills: 0 | Status: DISCONTINUED | OUTPATIENT
Start: 2023-08-12 | End: 2023-08-16

## 2023-08-12 RX ADMIN — ENOXAPARIN SODIUM 40 MILLIGRAM(S): 100 INJECTION SUBCUTANEOUS at 22:35

## 2023-08-12 RX ADMIN — Medication 5 MILLIGRAM(S): at 22:39

## 2023-08-12 RX ADMIN — POLYETHYLENE GLYCOL 3350 17 GRAM(S): 17 POWDER, FOR SOLUTION ORAL at 12:39

## 2023-08-12 RX ADMIN — Medication 1000 MILLIGRAM(S): at 05:12

## 2023-08-12 RX ADMIN — Medication 1000 MILLIGRAM(S): at 23:36

## 2023-08-12 RX ADMIN — Medication 6 MILLIGRAM(S): at 12:40

## 2023-08-12 RX ADMIN — HYDROMORPHONE HYDROCHLORIDE 4 MILLIGRAM(S): 2 INJECTION INTRAMUSCULAR; INTRAVENOUS; SUBCUTANEOUS at 11:21

## 2023-08-12 RX ADMIN — SENNA PLUS 2 TABLET(S): 8.6 TABLET ORAL at 22:35

## 2023-08-12 RX ADMIN — LACTULOSE 10 GRAM(S): 10 SOLUTION ORAL at 05:13

## 2023-08-12 RX ADMIN — Medication 6 MILLIGRAM(S): at 05:11

## 2023-08-12 RX ADMIN — BUDESONIDE AND FORMOTEROL FUMARATE DIHYDRATE 2 PUFF(S): 160; 4.5 AEROSOL RESPIRATORY (INHALATION) at 12:39

## 2023-08-12 RX ADMIN — HYDROMORPHONE HYDROCHLORIDE 4 MILLIGRAM(S): 2 INJECTION INTRAMUSCULAR; INTRAVENOUS; SUBCUTANEOUS at 12:21

## 2023-08-12 RX ADMIN — ATORVASTATIN CALCIUM 80 MILLIGRAM(S): 80 TABLET, FILM COATED ORAL at 22:36

## 2023-08-12 RX ADMIN — Medication 1000 MILLIGRAM(S): at 22:36

## 2023-08-12 RX ADMIN — HYDROMORPHONE HYDROCHLORIDE 4 MILLIGRAM(S): 2 INJECTION INTRAMUSCULAR; INTRAVENOUS; SUBCUTANEOUS at 19:13

## 2023-08-12 RX ADMIN — GABAPENTIN 600 MILLIGRAM(S): 400 CAPSULE ORAL at 22:37

## 2023-08-12 RX ADMIN — LOSARTAN POTASSIUM 50 MILLIGRAM(S): 100 TABLET, FILM COATED ORAL at 22:36

## 2023-08-12 RX ADMIN — HYDROMORPHONE HYDROCHLORIDE 4 MILLIGRAM(S): 2 INJECTION INTRAMUSCULAR; INTRAVENOUS; SUBCUTANEOUS at 18:13

## 2023-08-12 RX ADMIN — Medication 1000 MILLIGRAM(S): at 13:08

## 2023-08-12 RX ADMIN — Medication 1000 MILLIGRAM(S): at 06:12

## 2023-08-12 NOTE — PROGRESS NOTE ADULT - ASSESSMENT
67 year old woman admitted to the hospital for an elective TLIF L4-L5        Impression and Plan   # Lumbar Radiculopathy S/p TLIF L4-L5 on 8/7/23   - pain control , DVT prophylaxis , Weightbearing status , Dressing changes and drain care per ortho team    # Acute Hypoxia without signs of respiratory failure   - Secondary to splinting from pain and atelectasis , pain control , incentive spirometry , Encourage OOBTC , O2 weaned off on 8/11 , current spo2 92-94% on room air   - Recommend Outpatient Sleep Study     # Chronic Sinusitis  - Continue Flonase     # HTN   - Hold Losartan while inpatient and discharge  Olmesartan and HCTZ     # HLD   - Atorvastatin     #DVT PPx -    #Dispo - FIORELLA      67 year old woman admitted to the hospital for an elective TLIF L4-L5    Impression and Plan   # Lumbar Radiculopathy S/p TLIF L4-L5 on 8/7/23   - pain control , DVT prophylaxis , Weightbearing status , Dressing changes and drain care per ortho team    # Acute Hypoxia without signs of respiratory failure   - Secondary to splinting from pain and atelectasis , pain control , incentive spirometry , Encourage OOBTC , O2 weaned off on 8/11 , current spo2 92-94% on room air   - Recommend Outpatient Sleep Study     # Chronic Sinusitis  - Continue Flonase     # HTN   - Hold Losartan while inpatient and discharge  Olmesartan and HCTZ     # HLD   - Atorvastatin     #hyperglycemia - noted on am labs --- check a1c    #DVT PPx - lovenox    #Dispo - FIORELLA      67 year old woman admitted to the hospital for an elective TLIF L4-L5    Impression and Plan   # Lumbar Radiculopathy S/p TLIF L4-L5 on 8/7/23   - pain control , DVT prophylaxis , Weightbearing status , Dressing changes and drain care per ortho team    # Acute Hypoxia without signs of respiratory failure   - Secondary to splinting from pain and atelectasis , pain control , incentive spirometry , Encourage OOBTC , O2 weaned off on 8/11 , current spo2 92-94% on room air   - Recommend Outpatient Sleep Study     # Chronic Sinusitis  - Continue Flonase     # HTN   - Hold Losartan while inpatient and discharge  Olmesartan and HCTZ     # HLD   - Atorvastatin     #pre-DM  a1c 5.8  outpt followup for counseling/lifestyle changes    #DVT PPx - lovenox    #Dispo - [facility]LLA

## 2023-08-12 NOTE — PROGRESS NOTE ADULT - SUBJECTIVE AND OBJECTIVE BOX
OVERNIGHT EVENTS:    SUBJECTIVE / INTERVAL HPI: Patient seen and examined at bedside.     VITAL SIGNS:  Vital Signs Last 24 Hrs  T(C): 36.9 (12 Aug 2023 08:33), Max: 37.3 (11 Aug 2023 14:54)  T(F): 98.4 (12 Aug 2023 08:33), Max: 99.2 (11 Aug 2023 14:54)  HR: 99 (12 Aug 2023 08:33) (93 - 103)  BP: 127/85 (12 Aug 2023 08:33) (100/70 - 127/85)  BP(mean): --  RR: 15 (12 Aug 2023 08:33) (15 - 15)  SpO2: 94% (12 Aug 2023 08:33) (93% - 94%)    Parameters below as of 12 Aug 2023 08:33  Patient On (Oxygen Delivery Method): room air        08-11-23 @ 07:01  -  08-12-23 @ 07:00  --------------------------------------------------------  IN: 0 mL / OUT: 230 mL / NET: -230 mL    08-12-23 @ 07:01  -  08-12-23 @ 10:07  --------------------------------------------------------  IN: 240 mL / OUT: 400 mL / NET: -160 mL        PHYSICAL EXAM:    General: WDWN  HEENT: NC/AT; PERRL, anicteric sclera; MMM  Neck: supple  Cardiovascular: +S1/S2; RRR  Respiratory: CTA B/L; no W/R/R  Gastrointestinal: soft, NT/ND; +BSx4  Extremities: WWP; no edema, clubbing or cyanosis  Vascular: 2+ radial, DP/PT pulses B/L  Neurological: AAOx3; no focal deficits    MEDICATIONS:  MEDICATIONS  (STANDING):  acetaminophen     Tablet .. 1000 milliGRAM(s) Oral every 8 hours  atorvastatin 80 milliGRAM(s) Oral daily  budesonide 160 MICROgram(s)/formoterol 4.5 MICROgram(s) Inhaler 2 Puff(s) Inhalation daily  dexAMETHasone  Injectable 6 milliGRAM(s) IV Push every 6 hours  enoxaparin Injectable 40 milliGRAM(s) SubCutaneous every 24 hours  gabapentin 600 milliGRAM(s) Oral at bedtime  lidocaine 1% Injectable 5 milliLiter(s) Local Injection once  ondansetron   Disintegrating Tablet 4 milliGRAM(s) Oral every 6 hours  polyethylene glycol 3350 17 Gram(s) Oral daily  senna 2 Tablet(s) Oral at bedtime  triamcinolone    acetonide 40 mG/mL Injectable (KENALOG-40) 40 milliGRAM(s) IntraArticular once    MEDICATIONS  (PRN):  aluminum hydroxide/magnesium hydroxide/simethicone Suspension 30 milliLiter(s) Oral every 4 hours PRN Dyspepsia  benzocaine/menthol Lozenge 1 Lozenge Oral four times a day PRN Sore Throat  HYDROmorphone   Tablet 4 milliGRAM(s) Oral every 4 hours PRN Severe Pain (7 - 10)  HYDROmorphone   Tablet 2 milliGRAM(s) Oral every 4 hours PRN Moderate Pain (4 - 6)  lactulose Syrup 10 Gram(s) Oral three times a day PRN constipation  melatonin 5 milliGRAM(s) Oral at bedtime PRN Sleep  metoclopramide Injectable 10 milliGRAM(s) IV Push every 8 hours PRN for nausea or vomiting, second line      ALLERGIES:  Allergies    environmental allergy.....sneezing;  itchy eyes (Other)  lisinopril (Other)    Intolerances        LABS:                        11.7   10.97 )-----------( 243      ( 12 Aug 2023 06:34 )             36.0     08-12    134<L>  |  101  |  12  ----------------------------<  223<H>  4.5   |  24  |  0.64    Ca    9.8      12 Aug 2023 06:34        Urinalysis Basic - ( 12 Aug 2023 06:34 )    Color: x / Appearance: x / SG: x / pH: x  Gluc: 223 mg/dL / Ketone: x  / Bili: x / Urobili: x   Blood: x / Protein: x / Nitrite: x   Leuk Esterase: x / RBC: x / WBC x   Sq Epi: x / Non Sq Epi: x / Bacteria: x      CAPILLARY BLOOD GLUCOSE            RADIOLOGY & ADDITIONAL TESTS: Reviewed.    ASSESSMENT:    PLAN:  SUBJECTIVE / INTERVAL HPI: Patient seen and examined at bedside. Pain controlled, no specific concerns/complaints    VITAL SIGNS:  Vital Signs Last 24 Hrs  T(C): 36.9 (12 Aug 2023 08:33), Max: 37.3 (11 Aug 2023 14:54)  T(F): 98.4 (12 Aug 2023 08:33), Max: 99.2 (11 Aug 2023 14:54)  HR: 99 (12 Aug 2023 08:33) (93 - 103)  BP: 127/85 (12 Aug 2023 08:33) (100/70 - 127/85)  BP(mean): --  RR: 15 (12 Aug 2023 08:33) (15 - 15)  SpO2: 94% (12 Aug 2023 08:33) (93% - 94%)    Parameters below as of 12 Aug 2023 08:33  Patient On (Oxygen Delivery Method): room air        08-11-23 @ 07:01  -  08-12-23 @ 07:00  --------------------------------------------------------  IN: 0 mL / OUT: 230 mL / NET: -230 mL    08-12-23 @ 07:01  -  08-12-23 @ 10:07  --------------------------------------------------------  IN: 240 mL / OUT: 400 mL / NET: -160 mL        PHYSICAL EXAM:    General: NAD  HEENT: NC/AT  Neck: supple  Cardiovascular: +S1/S2; RRR  Respiratory: CTA B/L; no W/R/R  Gastrointestinal: soft, NT/ND; +BSx4  Extremities: WWP; no edema, clubbing or cyanosis  Vascular: 2+ radial, DP/PT pulses B/L  Neurological: AAOx3; no focal deficits      MEDICATIONS:  MEDICATIONS  (STANDING):  acetaminophen     Tablet .. 1000 milliGRAM(s) Oral every 8 hours  atorvastatin 80 milliGRAM(s) Oral daily  budesonide 160 MICROgram(s)/formoterol 4.5 MICROgram(s) Inhaler 2 Puff(s) Inhalation daily  dexAMETHasone  Injectable 6 milliGRAM(s) IV Push every 6 hours  enoxaparin Injectable 40 milliGRAM(s) SubCutaneous every 24 hours  gabapentin 600 milliGRAM(s) Oral at bedtime  lidocaine 1% Injectable 5 milliLiter(s) Local Injection once  ondansetron   Disintegrating Tablet 4 milliGRAM(s) Oral every 6 hours  polyethylene glycol 3350 17 Gram(s) Oral daily  senna 2 Tablet(s) Oral at bedtime  triamcinolone    acetonide 40 mG/mL Injectable (KENALOG-40) 40 milliGRAM(s) IntraArticular once    MEDICATIONS  (PRN):  aluminum hydroxide/magnesium hydroxide/simethicone Suspension 30 milliLiter(s) Oral every 4 hours PRN Dyspepsia  benzocaine/menthol Lozenge 1 Lozenge Oral four times a day PRN Sore Throat  HYDROmorphone   Tablet 4 milliGRAM(s) Oral every 4 hours PRN Severe Pain (7 - 10)  HYDROmorphone   Tablet 2 milliGRAM(s) Oral every 4 hours PRN Moderate Pain (4 - 6)  lactulose Syrup 10 Gram(s) Oral three times a day PRN constipation  melatonin 5 milliGRAM(s) Oral at bedtime PRN Sleep  metoclopramide Injectable 10 milliGRAM(s) IV Push every 8 hours PRN for nausea or vomiting, second line      ALLERGIES:  Allergies    environmental allergy.....sneezing;  itchy eyes (Other)  lisinopril (Other)    Intolerances        LABS:                        11.7   10.97 )-----------( 243      ( 12 Aug 2023 06:34 )             36.0     08-12    134<L>  |  101  |  12  ----------------------------<  223<H>  4.5   |  24  |  0.64    Ca    9.8      12 Aug 2023 06:34        Urinalysis Basic - ( 12 Aug 2023 06:34 )    Color: x / Appearance: x / SG: x / pH: x  Gluc: 223 mg/dL / Ketone: x  / Bili: x / Urobili: x   Blood: x / Protein: x / Nitrite: x   Leuk Esterase: x / RBC: x / WBC x   Sq Epi: x / Non Sq Epi: x / Bacteria: x      CAPILLARY BLOOD GLUCOSE            RADIOLOGY & ADDITIONAL TESTS: Reviewed.    ASSESSMENT:    PLAN:

## 2023-08-12 NOTE — PROGRESS NOTE ADULT - SUBJECTIVE AND OBJECTIVE BOX
Ortho Note    Pt seen and examined at bedside. States her pain has improved.  Denies CP, SOB, N/V, numbness/tingling       General: Pt Alert and oriented, NAD  DSG: telfa and tegaderm to lumbar spine  HV x 1 holding suction and LORENZO x 1 holding suction   Pulses: +2 DP palpable pulses   Sensation: SILT to L2-S1 dermatomes bilaterally   Motor: EHL/FHL/TA/GS 5/5 bilaterally                  LABS:                          11.7   10.97 )-----------( 243      ( 12 Aug 2023 06:34 )             36.0     08-12    134<L>  |  101  |  12  ----------------------------<  223<H>  4.5   |  24  |  0.64    Ca    9.8      12 Aug 2023 06:34          Urinalysis Basic - ( 12 Aug 2023 06:34 )    Color: x / Appearance: x / SG: x / pH: x  Gluc: 223 mg/dL / Ketone: x  / Bili: x / Urobili: x   Blood: x / Protein: x / Nitrite: x   Leuk Esterase: x / RBC: x / WBC x   Sq Epi: x / Non Sq Epi: x / Bacteria: x        A/P: 67yFemale POD#5 s/p L4-5 TLIF  - Stable  - Medicine co-management recommendations appreciated  - Pain Control  - Bowel regimen: senna at bedtime and miralax 17g qd; lactulose 10mg tid prn   - DVT ppx: LVX 40mg daily   - PT, WBS: WBAT, OOB  - bilateral LE doppler negative for DVT  DISPO: FI[facility]LLA     Ortho Pager [phone]

## 2023-08-13 LAB
ANION GAP SERPL CALC-SCNC: 9 MMOL/L — SIGNIFICANT CHANGE UP (ref 5–17)
BASOPHILS # BLD AUTO: 0.02 K/UL — SIGNIFICANT CHANGE UP (ref 0–0.2)
BASOPHILS NFR BLD AUTO: 0.2 % — SIGNIFICANT CHANGE UP (ref 0–2)
BUN SERPL-MCNC: 17 MG/DL — SIGNIFICANT CHANGE UP (ref 7–23)
CALCIUM SERPL-MCNC: 10.2 MG/DL — SIGNIFICANT CHANGE UP (ref 8.4–10.5)
CHLORIDE SERPL-SCNC: 98 MMOL/L — SIGNIFICANT CHANGE UP (ref 96–108)
CO2 SERPL-SCNC: 30 MMOL/L — SIGNIFICANT CHANGE UP (ref 22–31)
CREAT SERPL-MCNC: 0.8 MG/DL — SIGNIFICANT CHANGE UP (ref 0.5–1.3)
EGFR: 81 ML/MIN/1.73M2 — SIGNIFICANT CHANGE UP
EOSINOPHIL # BLD AUTO: 0 K/UL — SIGNIFICANT CHANGE UP (ref 0–0.5)
EOSINOPHIL NFR BLD AUTO: 0 % — SIGNIFICANT CHANGE UP (ref 0–6)
GLUCOSE SERPL-MCNC: 128 MG/DL — HIGH (ref 70–99)
HCT VFR BLD CALC: 40.1 % — SIGNIFICANT CHANGE UP (ref 34.5–45)
HGB BLD-MCNC: 12.7 G/DL — SIGNIFICANT CHANGE UP (ref 11.5–15.5)
IMM GRANULOCYTES NFR BLD AUTO: 0.6 % — SIGNIFICANT CHANGE UP (ref 0–0.9)
LYMPHOCYTES # BLD AUTO: 19 % — SIGNIFICANT CHANGE UP (ref 13–44)
LYMPHOCYTES # BLD AUTO: 2.53 K/UL — SIGNIFICANT CHANGE UP (ref 1–3.3)
MCHC RBC-ENTMCNC: 29.7 PG — SIGNIFICANT CHANGE UP (ref 27–34)
MCHC RBC-ENTMCNC: 31.7 GM/DL — LOW (ref 32–36)
MCV RBC AUTO: 93.9 FL — SIGNIFICANT CHANGE UP (ref 80–100)
MONOCYTES # BLD AUTO: 0.7 K/UL — SIGNIFICANT CHANGE UP (ref 0–0.9)
MONOCYTES NFR BLD AUTO: 5.3 % — SIGNIFICANT CHANGE UP (ref 2–14)
NEUTROPHILS # BLD AUTO: 10 K/UL — HIGH (ref 1.8–7.4)
NEUTROPHILS NFR BLD AUTO: 74.9 % — SIGNIFICANT CHANGE UP (ref 43–77)
NRBC # BLD: 0 /100 WBCS — SIGNIFICANT CHANGE UP (ref 0–0)
PLATELET # BLD AUTO: 315 K/UL — SIGNIFICANT CHANGE UP (ref 150–400)
POTASSIUM SERPL-MCNC: 4 MMOL/L — SIGNIFICANT CHANGE UP (ref 3.5–5.3)
POTASSIUM SERPL-SCNC: 4 MMOL/L — SIGNIFICANT CHANGE UP (ref 3.5–5.3)
RBC # BLD: 4.27 M/UL — SIGNIFICANT CHANGE UP (ref 3.8–5.2)
RBC # FLD: 14.1 % — SIGNIFICANT CHANGE UP (ref 10.3–14.5)
SODIUM SERPL-SCNC: 137 MMOL/L — SIGNIFICANT CHANGE UP (ref 135–145)
WBC # BLD: 13.23 K/UL — HIGH (ref 3.8–10.5)
WBC # FLD AUTO: 13.23 K/UL — HIGH (ref 3.8–10.5)

## 2023-08-13 PROCEDURE — 99233 SBSQ HOSP IP/OBS HIGH 50: CPT

## 2023-08-13 PROCEDURE — 71046 X-RAY EXAM CHEST 2 VIEWS: CPT | Mod: 26

## 2023-08-13 RX ADMIN — Medication 1000 MILLIGRAM(S): at 21:32

## 2023-08-13 RX ADMIN — HYDROMORPHONE HYDROCHLORIDE 4 MILLIGRAM(S): 2 INJECTION INTRAMUSCULAR; INTRAVENOUS; SUBCUTANEOUS at 23:15

## 2023-08-13 RX ADMIN — Medication 1000 MILLIGRAM(S): at 14:39

## 2023-08-13 RX ADMIN — HYDROMORPHONE HYDROCHLORIDE 4 MILLIGRAM(S): 2 INJECTION INTRAMUSCULAR; INTRAVENOUS; SUBCUTANEOUS at 11:22

## 2023-08-13 RX ADMIN — BUDESONIDE AND FORMOTEROL FUMARATE DIHYDRATE 2 PUFF(S): 160; 4.5 AEROSOL RESPIRATORY (INHALATION) at 11:18

## 2023-08-13 RX ADMIN — Medication 1000 MILLIGRAM(S): at 13:39

## 2023-08-13 RX ADMIN — GABAPENTIN 600 MILLIGRAM(S): 400 CAPSULE ORAL at 21:32

## 2023-08-13 RX ADMIN — HYDROMORPHONE HYDROCHLORIDE 4 MILLIGRAM(S): 2 INJECTION INTRAMUSCULAR; INTRAVENOUS; SUBCUTANEOUS at 12:22

## 2023-08-13 RX ADMIN — Medication 1000 MILLIGRAM(S): at 06:22

## 2023-08-13 RX ADMIN — LOSARTAN POTASSIUM 50 MILLIGRAM(S): 100 TABLET, FILM COATED ORAL at 21:32

## 2023-08-13 RX ADMIN — POLYETHYLENE GLYCOL 3350 17 GRAM(S): 17 POWDER, FOR SOLUTION ORAL at 11:18

## 2023-08-13 RX ADMIN — Medication 5 MILLIGRAM(S): at 21:42

## 2023-08-13 RX ADMIN — ATORVASTATIN CALCIUM 80 MILLIGRAM(S): 80 TABLET, FILM COATED ORAL at 21:32

## 2023-08-13 RX ADMIN — Medication 1000 MILLIGRAM(S): at 07:13

## 2023-08-13 RX ADMIN — ENOXAPARIN SODIUM 40 MILLIGRAM(S): 100 INJECTION SUBCUTANEOUS at 21:31

## 2023-08-13 NOTE — PROVIDER CONTACT NOTE (OTHER) - ASSESSMENT
Patient VSS. See flow sheet. Abdominal hard and distended. Last bowel movement at midnight. Requesting for doctor at midnight.

## 2023-08-13 NOTE — PROGRESS NOTE ADULT - SUBJECTIVE AND OBJECTIVE BOX
OVERNIGHT EVENTS:    SUBJECTIVE / INTERVAL HPI: Patient seen and examined at bedside.     VITAL SIGNS:  Vital Signs Last 24 Hrs  T(C): 36.9 (13 Aug 2023 08:25), Max: 36.9 (13 Aug 2023 08:25)  T(F): 98.5 (13 Aug 2023 08:25), Max: 98.5 (13 Aug 2023 08:25)  HR: 77 (13 Aug 2023 08:25) (77 - 99)  BP: 110/78 (13 Aug 2023 08:25) (110/78 - 131/87)  BP(mean): 91 (12 Aug 2023 20:35) (91 - 91)  RR: 17 (13 Aug 2023 08:25) (17 - 20)  SpO2: 94% (13 Aug 2023 08:25) (93% - 94%)    Parameters below as of 13 Aug 2023 08:25  Patient On (Oxygen Delivery Method): room air        08-12-23 @ 07:01  -  08-13-23 @ 07:00  --------------------------------------------------------  IN: 480 mL / OUT: 1060 mL / NET: -580 mL        PHYSICAL EXAM:    General: WDWN  HEENT: NC/AT; PERRL, anicteric sclera; MMM  Neck: supple  Cardiovascular: +S1/S2; RRR  Respiratory: CTA B/L; no W/R/R  Gastrointestinal: soft, NT/ND; +BSx4  Extremities: WWP; no edema, clubbing or cyanosis  Vascular: 2+ radial, DP/PT pulses B/L  Neurological: AAOx3; no focal deficits    MEDICATIONS:  MEDICATIONS  (STANDING):  acetaminophen     Tablet .. 1000 milliGRAM(s) Oral every 8 hours  atorvastatin 80 milliGRAM(s) Oral daily  budesonide 160 MICROgram(s)/formoterol 4.5 MICROgram(s) Inhaler 2 Puff(s) Inhalation daily  enoxaparin Injectable 40 milliGRAM(s) SubCutaneous every 24 hours  gabapentin 600 milliGRAM(s) Oral at bedtime  lidocaine 1% Injectable 5 milliLiter(s) Local Injection once  losartan 50 milliGRAM(s) Oral daily  ondansetron   Disintegrating Tablet 4 milliGRAM(s) Oral every 6 hours  polyethylene glycol 3350 17 Gram(s) Oral daily  senna 2 Tablet(s) Oral at bedtime  triamcinolone    acetonide 40 mG/mL Injectable (KENALOG-40) 40 milliGRAM(s) IntraArticular once    MEDICATIONS  (PRN):  aluminum hydroxide/magnesium hydroxide/simethicone Suspension 30 milliLiter(s) Oral every 4 hours PRN Dyspepsia  benzocaine/menthol Lozenge 1 Lozenge Oral four times a day PRN Sore Throat  HYDROmorphone   Tablet 4 milliGRAM(s) Oral every 4 hours PRN Severe Pain (7 - 10)  HYDROmorphone   Tablet 2 milliGRAM(s) Oral every 4 hours PRN Moderate Pain (4 - 6)  lactulose Syrup 10 Gram(s) Oral three times a day PRN constipation  melatonin 5 milliGRAM(s) Oral at bedtime PRN Sleep  metoclopramide Injectable 10 milliGRAM(s) IV Push every 8 hours PRN for nausea or vomiting, second line      ALLERGIES:  Allergies    environmental allergy.....sneezing;  itchy eyes (Other)  lisinopril (Other)    Intolerances        LABS:                        11.7   10.97 )-----------( 243      ( 12 Aug 2023 06:34 )             36.0     08-12    134<L>  |  101  |  12  ----------------------------<  223<H>  4.5   |  24  |  0.64    Ca    9.8      12 Aug 2023 06:34        Urinalysis Basic - ( 12 Aug 2023 06:34 )    Color: x / Appearance: x / SG: x / pH: x  Gluc: 223 mg/dL / Ketone: x  / Bili: x / Urobili: x   Blood: x / Protein: x / Nitrite: x   Leuk Esterase: x / RBC: x / WBC x   Sq Epi: x / Non Sq Epi: x / Bacteria: x      CAPILLARY BLOOD GLUCOSE            RADIOLOGY & ADDITIONAL TESTS: Reviewed.    ASSESSMENT:    PLAN:  HOSP COMT PROGRESS NOTE    SUBJECTIVE / INTERVAL HPI: Patient seen and examined at bedside. Says pain is slightly worse today than yesterday. Had a BM today but was "hard." Continues to wake up at night gasping for air -- knows she needs sleep study outpatient.     VITAL SIGNS:  Vital Signs Last 24 Hrs  T(C): 36.9 (13 Aug 2023 08:25), Max: 36.9 (13 Aug 2023 08:25)  T(F): 98.5 (13 Aug 2023 08:25), Max: 98.5 (13 Aug 2023 08:25)  HR: 77 (13 Aug 2023 08:25) (77 - 99)  BP: 110/78 (13 Aug 2023 08:25) (110/78 - 131/87)  BP(mean): 91 (12 Aug 2023 20:35) (91 - 91)  RR: 17 (13 Aug 2023 08:25) (17 - 20)  SpO2: 94% (13 Aug 2023 08:25) (93% - 94%)    Parameters below as of 13 Aug 2023 08:25  Patient On (Oxygen Delivery Method): room air        08-12-23 @ 07:01  -  08-13-23 @ 07:00  --------------------------------------------------------  IN: 480 mL / OUT: 1060 mL / NET: -580 mL        PHYSICAL EXAM:    General: Sitting in bedside chair, NAD  HEENT: NC/AT; PERRL, anicteric sclera; MMM  Neck: supple  Cardiovascular: +S1/S2; RRR  Respiratory: slightly decreased breath sounds at bases  Gastrointestinal: soft, NT/ND; +BSx4  Extremities: WWP; no edema, clubbing or cyanosis  Vascular: 2+ radial, DP/PT pulses B/L  Neurological: AAOx3; no focal deficits    MEDICATIONS:  MEDICATIONS  (STANDING):  acetaminophen     Tablet .. 1000 milliGRAM(s) Oral every 8 hours  atorvastatin 80 milliGRAM(s) Oral daily  budesonide 160 MICROgram(s)/formoterol 4.5 MICROgram(s) Inhaler 2 Puff(s) Inhalation daily  enoxaparin Injectable 40 milliGRAM(s) SubCutaneous every 24 hours  gabapentin 600 milliGRAM(s) Oral at bedtime  lidocaine 1% Injectable 5 milliLiter(s) Local Injection once  losartan 50 milliGRAM(s) Oral daily  ondansetron   Disintegrating Tablet 4 milliGRAM(s) Oral every 6 hours  polyethylene glycol 3350 17 Gram(s) Oral daily  senna 2 Tablet(s) Oral at bedtime  triamcinolone    acetonide 40 mG/mL Injectable (KENALOG-40) 40 milliGRAM(s) IntraArticular once    MEDICATIONS  (PRN):  aluminum hydroxide/magnesium hydroxide/simethicone Suspension 30 milliLiter(s) Oral every 4 hours PRN Dyspepsia  benzocaine/menthol Lozenge 1 Lozenge Oral four times a day PRN Sore Throat  HYDROmorphone   Tablet 4 milliGRAM(s) Oral every 4 hours PRN Severe Pain (7 - 10)  HYDROmorphone   Tablet 2 milliGRAM(s) Oral every 4 hours PRN Moderate Pain (4 - 6)  lactulose Syrup 10 Gram(s) Oral three times a day PRN constipation  melatonin 5 milliGRAM(s) Oral at bedtime PRN Sleep  metoclopramide Injectable 10 milliGRAM(s) IV Push every 8 hours PRN for nausea or vomiting, second line      ALLERGIES:  Allergies    environmental allergy.....sneezing;  itchy eyes (Other)  lisinopril (Other)    Intolerances        LABS:                        11.7   10.97 )-----------( 243      ( 12 Aug 2023 06:34 )             36.0     08-12    134<L>  |  101  |  12  ----------------------------<  223<H>  4.5   |  24  |  0.64    Ca    9.8      12 Aug 2023 06:34        Urinalysis Basic - ( 12 Aug 2023 06:34 )    Color: x / Appearance: x / SG: x / pH: x  Gluc: 223 mg/dL / Ketone: x  / Bili: x / Urobili: x   Blood: x / Protein: x / Nitrite: x   Leuk Esterase: x / RBC: x / WBC x   Sq Epi: x / Non Sq Epi: x / Bacteria: x      CAPILLARY BLOOD GLUCOSE            RADIOLOGY & ADDITIONAL TESTS: Reviewed.    ASSESSMENT:    PLAN:

## 2023-08-13 NOTE — PROGRESS NOTE ADULT - ASSESSMENT
67 year old woman admitted to the hospital for an elective TLIF L4-L5    Impression and Plan   # Lumbar Radiculopathy S/p TLIF L4-L5 on 8/7/23   - pain control , DVT prophylaxis , Weightbearing status , Dressing changes and drain care per ortho team    # Acute Hypoxia without signs of respiratory failure   - Secondary to splinting from pain and atelectasis , pain control , incentive spirometry , Encourage OOBTC , O2 weaned off on 8/11 , current spo2 92-94% on room air   - Recommend Outpatient Sleep Study     # Chronic Sinusitis  - Continue Flonase     # HTN   - Hold Losartan while inpatient and discharge  Olmesartan and HCTZ     # HLD   - Atorvastatin     #pre-DM  a1c 5.8  outpt followup for counseling/lifestyle changes    #DVT PPx - lovenox    #Dispo - FIORELLA      67 year old woman admitted to the hospital for an elective TLIF L4-L5    Impression and Plan   # Lumbar Radiculopathy S/p TLIF L4-L5 on 8/7/23   - pain control , DVT prophylaxis , Weightbearing status , Dressing changes and drain care per ortho team    #leukocytosis  uptrending wbc x 3 days  pt appears nontoxic, no fevers  add diff  given worsening pain would request ortho to investigate for post-op infection  given lung exam and borderline O2 sats would order 2 view CXR  may be reactive but given timing -- pod6 -- would investigate other causes as well    # Acute Hypoxia without signs of respiratory failure   - likely Secondary to splinting from pain and atelectasis , pain control , incentive spirometry , Encourage OOBTC , O2 weaned off on 8/11 , current spo2 92-94% on room air   given uptrending wbc would order cxr    #c/f ESTEFANY - high concern for ESTEFANY based on patients report of waking up gasping for air and snoring  outpatient sleep study    # Chronic Sinusitis  - Continue Flonase     # HTN   -on losartan inpt  -d/c on home meds -- olmesartan, hctz    # HLD   - Atorvastatin     #pre-DM  a1c 5.8  outpt followup for counseling/lifestyle changes    #DVT PPx - lovenox    #Dispo - FIORELLA      67 year old woman admitted to the hospital for an elective TLIF L4-L5    Impression and Plan   # Lumbar Radiculopathy S/p TLIF L4-L5 on 8/7/23   - pain control , DVT prophylaxis , Weightbearing status , Dressing changes and drain care per ortho team    #leukocytosis  uptrending wbc x 3 days  pt appears nontoxic, no fevers  add diff  given worsening pain would request ortho to investigate for post-op infection  given lung exam and borderline O2 sats would order 2 view CXR  may be reactive but given timing -- pod6 -- would investigate other causes as well  would recommend against discharge with uptrending white count    # Acute Hypoxia without signs of respiratory failure   - likely Secondary to splinting from pain and atelectasis , pain control , incentive spirometry , Encourage OOBTC , O2 weaned off on 8/11 , current spo2 92-94% on room air   given uptrending wbc would order cxr    #c/f ESTEFANY - high concern for ESTEFANY based on patients report of waking up gasping for air and snoring  outpatient sleep study    # Chronic Sinusitis  - Continue Flonase     # HTN   -on losartan inpt  -d/c on home meds -- olmesartan, hctz    # HLD   - Atorvastatin     #pre-DM  a1c 5.8  outpt followup for counseling/lifestyle changes    #DVT PPx - lovenox    #Dispo - FIORELLA

## 2023-08-13 NOTE — PROGRESS NOTE ADULT - SUBJECTIVE AND OBJECTIVE BOX
Ortho Note    Pt seen and examined at bedside. No complaints  Denies CP, SOB, N/V, numbness/tingling     Vital Signs Last 24 Hrs  T(C): 36.6 (13 Aug 2023 04:00), Max: 36.9 (12 Aug 2023 08:33)  T(F): 97.8 (13 Aug 2023 04:00), Max: 98.4 (12 Aug 2023 08:33)  HR: 79 (13 Aug 2023 04:00) (79 - 99)  BP: 118/76 (13 Aug 2023 04:00) (118/76 - 131/87)  BP(mean): 91 (12 Aug 2023 20:35) (91 - 91)  RR: 20 (13 Aug 2023 04:00) (15 - 20)  SpO2: 94% (13 Aug 2023 04:00) (93% - 94%)    Parameters below as of 13 Aug 2023 04:00  Patient On (Oxygen Delivery Method): nasal cannula  O2 Flow (L/min): 2        General: Pt Alert and oriented, NAD  DSG: telfa and tegaderm to lumbar spine  Pulses: +2 DP palpable pulses   Sensation: SILT to L2-S1 dermatomes bilaterally   Motor: EHL/FHL/TA/GS 5/5 bilaterally              LABS:                          11.7   10.97 )-----------( 243      ( 12 Aug 2023 06:34 )             36.0     08-12    134<L>  |  101  |  12  ----------------------------<  223<H>  4.5   |  24  |  0.64    Ca    9.8      12 Aug 2023 06:34          Urinalysis Basic - ( 12 Aug 2023 06:34 )    Color: x / Appearance: x / SG: x / pH: x  Gluc: 223 mg/dL / Ketone: x  / Bili: x / Urobili: x   Blood: x / Protein: x / Nitrite: x   Leuk Esterase: x / RBC: x / WBC x   Sq Epi: x / Non Sq Epi: x / Bacteria: x        A/P: 67yFemale POD#6 s/p L4-5 TLIF  - Stable  - Medicine co-management recommendations appreciated  - Pain Control  - Bowel regimen: senna at bedtime and miralax 17g qd; lactulose 10mg tid prn   - DVT ppx: LVX 40mg daily   - PT, WBS: WBAT, OOB  - bilateral LE doppler negative for DVT  DISPO: FIORELLA Verde Pager [phone]

## 2023-08-14 LAB
ANION GAP SERPL CALC-SCNC: 9 MMOL/L — SIGNIFICANT CHANGE UP (ref 5–17)
BASOPHILS # BLD AUTO: 0.02 K/UL — SIGNIFICANT CHANGE UP (ref 0–0.2)
BASOPHILS NFR BLD AUTO: 0.2 % — SIGNIFICANT CHANGE UP (ref 0–2)
BUN SERPL-MCNC: 15 MG/DL — SIGNIFICANT CHANGE UP (ref 7–23)
CALCIUM SERPL-MCNC: 9.1 MG/DL — SIGNIFICANT CHANGE UP (ref 8.4–10.5)
CHLORIDE SERPL-SCNC: 100 MMOL/L — SIGNIFICANT CHANGE UP (ref 96–108)
CO2 SERPL-SCNC: 27 MMOL/L — SIGNIFICANT CHANGE UP (ref 22–31)
CREAT SERPL-MCNC: 0.81 MG/DL — SIGNIFICANT CHANGE UP (ref 0.5–1.3)
EGFR: 80 ML/MIN/1.73M2 — SIGNIFICANT CHANGE UP
EOSINOPHIL # BLD AUTO: 0.06 K/UL — SIGNIFICANT CHANGE UP (ref 0–0.5)
EOSINOPHIL NFR BLD AUTO: 0.6 % — SIGNIFICANT CHANGE UP (ref 0–6)
GLUCOSE SERPL-MCNC: 101 MG/DL — HIGH (ref 70–99)
HCT VFR BLD CALC: 33.4 % — LOW (ref 34.5–45)
HGB BLD-MCNC: 10.9 G/DL — LOW (ref 11.5–15.5)
IMM GRANULOCYTES NFR BLD AUTO: 0.7 % — SIGNIFICANT CHANGE UP (ref 0–0.9)
LYMPHOCYTES # BLD AUTO: 4.61 K/UL — HIGH (ref 1–3.3)
LYMPHOCYTES # BLD AUTO: 44.4 % — HIGH (ref 13–44)
MCHC RBC-ENTMCNC: 30.3 PG — SIGNIFICANT CHANGE UP (ref 27–34)
MCHC RBC-ENTMCNC: 32.6 GM/DL — SIGNIFICANT CHANGE UP (ref 32–36)
MCV RBC AUTO: 92.8 FL — SIGNIFICANT CHANGE UP (ref 80–100)
MONOCYTES # BLD AUTO: 1.17 K/UL — HIGH (ref 0–0.9)
MONOCYTES NFR BLD AUTO: 11.3 % — SIGNIFICANT CHANGE UP (ref 2–14)
NEUTROPHILS # BLD AUTO: 4.46 K/UL — SIGNIFICANT CHANGE UP (ref 1.8–7.4)
NEUTROPHILS NFR BLD AUTO: 42.8 % — LOW (ref 43–77)
NRBC # BLD: 0 /100 WBCS — SIGNIFICANT CHANGE UP (ref 0–0)
PLATELET # BLD AUTO: 302 K/UL — SIGNIFICANT CHANGE UP (ref 150–400)
POTASSIUM SERPL-MCNC: 4.5 MMOL/L — SIGNIFICANT CHANGE UP (ref 3.5–5.3)
POTASSIUM SERPL-SCNC: 4.5 MMOL/L — SIGNIFICANT CHANGE UP (ref 3.5–5.3)
RBC # BLD: 3.6 M/UL — LOW (ref 3.8–5.2)
RBC # FLD: 13.8 % — SIGNIFICANT CHANGE UP (ref 10.3–14.5)
SODIUM SERPL-SCNC: 136 MMOL/L — SIGNIFICANT CHANGE UP (ref 135–145)
WBC # BLD: 10.39 K/UL — SIGNIFICANT CHANGE UP (ref 3.8–10.5)
WBC # FLD AUTO: 10.39 K/UL — SIGNIFICANT CHANGE UP (ref 3.8–10.5)

## 2023-08-14 PROCEDURE — 99232 SBSQ HOSP IP/OBS MODERATE 35: CPT

## 2023-08-14 RX ADMIN — BUDESONIDE AND FORMOTEROL FUMARATE DIHYDRATE 2 PUFF(S): 160; 4.5 AEROSOL RESPIRATORY (INHALATION) at 11:07

## 2023-08-14 RX ADMIN — LOSARTAN POTASSIUM 50 MILLIGRAM(S): 100 TABLET, FILM COATED ORAL at 21:07

## 2023-08-14 RX ADMIN — HYDROMORPHONE HYDROCHLORIDE 4 MILLIGRAM(S): 2 INJECTION INTRAMUSCULAR; INTRAVENOUS; SUBCUTANEOUS at 10:02

## 2023-08-14 RX ADMIN — Medication 1000 MILLIGRAM(S): at 22:08

## 2023-08-14 RX ADMIN — POLYETHYLENE GLYCOL 3350 17 GRAM(S): 17 POWDER, FOR SOLUTION ORAL at 11:07

## 2023-08-14 RX ADMIN — Medication 1000 MILLIGRAM(S): at 13:05

## 2023-08-14 RX ADMIN — Medication 1000 MILLIGRAM(S): at 21:08

## 2023-08-14 RX ADMIN — ATORVASTATIN CALCIUM 80 MILLIGRAM(S): 80 TABLET, FILM COATED ORAL at 21:07

## 2023-08-14 RX ADMIN — HYDROMORPHONE HYDROCHLORIDE 4 MILLIGRAM(S): 2 INJECTION INTRAMUSCULAR; INTRAVENOUS; SUBCUTANEOUS at 22:07

## 2023-08-14 RX ADMIN — Medication 5 MILLIGRAM(S): at 21:56

## 2023-08-14 RX ADMIN — Medication 1000 MILLIGRAM(S): at 06:10

## 2023-08-14 RX ADMIN — HYDROMORPHONE HYDROCHLORIDE 4 MILLIGRAM(S): 2 INJECTION INTRAMUSCULAR; INTRAVENOUS; SUBCUTANEOUS at 11:02

## 2023-08-14 RX ADMIN — HYDROMORPHONE HYDROCHLORIDE 4 MILLIGRAM(S): 2 INJECTION INTRAMUSCULAR; INTRAVENOUS; SUBCUTANEOUS at 03:59

## 2023-08-14 RX ADMIN — Medication 1000 MILLIGRAM(S): at 14:05

## 2023-08-14 RX ADMIN — HYDROMORPHONE HYDROCHLORIDE 4 MILLIGRAM(S): 2 INJECTION INTRAMUSCULAR; INTRAVENOUS; SUBCUTANEOUS at 00:15

## 2023-08-14 RX ADMIN — SENNA PLUS 2 TABLET(S): 8.6 TABLET ORAL at 21:08

## 2023-08-14 RX ADMIN — GABAPENTIN 600 MILLIGRAM(S): 400 CAPSULE ORAL at 21:07

## 2023-08-14 RX ADMIN — HYDROMORPHONE HYDROCHLORIDE 4 MILLIGRAM(S): 2 INJECTION INTRAMUSCULAR; INTRAVENOUS; SUBCUTANEOUS at 04:59

## 2023-08-14 RX ADMIN — ENOXAPARIN SODIUM 40 MILLIGRAM(S): 100 INJECTION SUBCUTANEOUS at 21:08

## 2023-08-14 RX ADMIN — HYDROMORPHONE HYDROCHLORIDE 4 MILLIGRAM(S): 2 INJECTION INTRAMUSCULAR; INTRAVENOUS; SUBCUTANEOUS at 21:07

## 2023-08-14 NOTE — DIETITIAN INITIAL EVALUATION ADULT - OTHER INFO
67F w preDM (6), here for elective TLIF L4-L5 w Dr. Madera 8/7, post-op c/b acute hypoxemia d/t atelectasis, now resolved.     On assessment, pt sitting in chair at bedside. Labs and medication orders reviewed. hgbA1c 5.8% (8/12). On Regular diet. Pt reports good appetite and intake. Reports UBW 170lb, admission wt 177lb/80.3kg noted. Pt denies nausea/vomiting/diarrhea/constipation, reports last BM yesterday 8/13. Denies abdominal distension/discomfort, no pain reported. Pt denies difficulty chewing/swallowing. Confirms NKFA. No Jewish/ethnic/cultural food preferences noted. No pressure injuries or edema documented, Christoph score 20. Educated on nutrition therapies for elevated blood glucose and elevated cholesterol. See nutrition recommendations. RD to follow-up per protocol.

## 2023-08-14 NOTE — DIETITIAN INITIAL EVALUATION ADULT - ADD RECOMMEND
1. Continue Regular diet as ordered.   >>Encourage & monitor PO intake. [place] dietary preferences as able.   2. Monitor GI tolerance, weight trends, labs, & skin integrity.  3. Defer bowel and pain regimens to team.   4. RD to remain available for diet education/intervention prn.

## 2023-08-14 NOTE — DIETITIAN INITIAL EVALUATION ADULT - EDUCATION DIETARY MODIFICATIONS
Educated on consistent carbohydrate diet and encouraged pairing protein foods with carbohydrates to promote blood glucose control. Discussed sources of saturated fat and dietary cholesterol to limit, and sources of dietary fiber to incorporate to facilitate heart health and cholesterol control. Answered all pt questions. Pt aware RD remains available for additional questions/concerns./(2) meets goals/outcomes/verbalization

## 2023-08-14 NOTE — DIETITIAN INITIAL EVALUATION ADULT - OTHER CALCULATIONS
Estimated needs based on IBW as ABW 177lb/80.3kg is 177% IBW. Needs adjusted for age and status post OR.

## 2023-08-14 NOTE — PROGRESS NOTE ADULT - SUBJECTIVE AND OBJECTIVE BOX
Ortho Note    Patient seen and examined at bedside. Pt comfortable without complaints, pain controlled. Voiding freely, LBM 8/13.   Denies CP, SOB, N/V, new numbness/tingling     Vital Signs Last 24 Hrs  T(C): 37.1 (08-14-23 @ 08:15), Max: 37.1 (08-14-23 @ 08:15)  T(F): 98.7 (08-14-23 @ 08:15), Max: 98.7 (08-14-23 @ 08:15)  HR: 70 (08-14-23 @ 08:15) (70 - 70)  BP: 120/83 (08-14-23 @ 08:15) (120/83 - 120/83)  BP(mean): --  RR: 17 (08-14-23 @ 08:15) (17 - 17)  SpO2: 98% (08-14-23 @ 08:15) (98% - 98%)  I&O's Summary      General: Pt Alert and oriented, NAD  DSG C/D/I- abdominal pad, tegaderm to lumbar spine  Pulses: 2+ DP pulses palpable bilaterally, skin wwp, cap refill brisk. No calf tenderness.  Sensation: SILT to L2-S1 dermatomes bilaterally.   Motor: EHL/FHL/TA/GS 5/5 bilaterally                          10.9   10.39 )-----------( 302      ( 14 Aug 2023 07:31 )             33.4     08-14    136  |  100  |  15  ----------------------------<  101<H>  4.5   |  27  |  0.81    Ca    9.1      14 Aug 2023 07:31        A/P: 66yo Female POD#7 s/p L4-5 TLIF   - Stable, appreciate medical comanagement  - Post op atelectasis - resolved. O2 sat 98% on RA. WBC downtrending, 10.39 (13.23)  - Pain Control  - OOB/IS  - Bowel Regimen   - DVT ppx: LVX 40mg daily  - PT, WBS: WBAT  - Dispo: FIORELLA pending acceptance/auth    Ortho Pager [phone]

## 2023-08-14 NOTE — DIETITIAN INITIAL EVALUATION ADULT - PERTINENT LABORATORY DATA
08-14    136  |  100  |  15  ----------------------------<  101<H>  4.5   |  27  |  0.81    Ca    9.1      14 Aug 2023 07:31    A1C with Estimated Average Glucose Result: 5.8 % (08-12-23 @ 06:34)

## 2023-08-14 NOTE — PROGRESS NOTE ADULT - ASSESSMENT
67F w preDM (6), here for elective TLIF L4-L5 w Dr. Madera 8/7, post-op c/b acute hypoxemia d/t atelectasis, now resolved.     #Acute hypoxemic respiratory failure - d/t atelectasis - resolved    #Post-op state. Pain controlled. PPx; SQL. On bowel regimen and incentive spirometer  #Lumbar radiculopathy - mgmt per ortho   - scheduled tylenol 1g q8, gabapentin 600 HS   - PRN: dilaudid 2mg/4mg  PO q4h for MOD/SEVERE    #Blood loss anemia - 10.9 from 12.7. Asymptomatic  #Leukocytosis - likely reactive. Non toxic appearing  #HTN - c/w losartan 50 daily. Home on olmesartan - hctz 20-12.5 daily  #HLD - c/w home lipitor  #Asthma - PRN symbicort - does not appear in exacerbation  #Obesity - BMI 34. Affects all aspects of care    Plan  Overall doing well. Titrated to room air - Suspect hypoxemia d/t post-op atelectasis as evidenced by CXR. Continue to encourage OOB, mobility, and use of incentive spirometer.     DISPO: FIORELLA JONES pending progress

## 2023-08-14 NOTE — DIETITIAN INITIAL EVALUATION ADULT - PERTINENT MEDS FT
MEDICATIONS  (STANDING):  acetaminophen     Tablet .. 1000 milliGRAM(s) Oral every 8 hours  atorvastatin 80 milliGRAM(s) Oral daily  budesonide 160 MICROgram(s)/formoterol 4.5 MICROgram(s) Inhaler 2 Puff(s) Inhalation daily  enoxaparin Injectable 40 milliGRAM(s) SubCutaneous every 24 hours  gabapentin 600 milliGRAM(s) Oral at bedtime  lidocaine 1% Injectable 5 milliLiter(s) Local Injection once  losartan 50 milliGRAM(s) Oral daily  ondansetron   Disintegrating Tablet 4 milliGRAM(s) Oral every 6 hours  polyethylene glycol 3350 17 Gram(s) Oral daily  senna 2 Tablet(s) Oral at bedtime  triamcinolone    acetonide 40 mG/mL Injectable (KENALOG-40) 40 milliGRAM(s) IntraArticular once    MEDICATIONS  (PRN):  aluminum hydroxide/magnesium hydroxide/simethicone Suspension 30 milliLiter(s) Oral every 4 hours PRN Dyspepsia  benzocaine/menthol Lozenge 1 Lozenge Oral four times a day PRN Sore Throat  HYDROmorphone   Tablet 4 milliGRAM(s) Oral every 4 hours PRN Severe Pain (7 - 10)  HYDROmorphone   Tablet 2 milliGRAM(s) Oral every 4 hours PRN Moderate Pain (4 - 6)  lactulose Syrup 10 Gram(s) Oral three times a day PRN constipation  melatonin 5 milliGRAM(s) Oral at bedtime PRN Sleep  metoclopramide Injectable 10 milliGRAM(s) IV Push every 8 hours PRN for nausea or vomiting, second line

## 2023-08-14 NOTE — PROGRESS NOTE ADULT - SUBJECTIVE AND OBJECTIVE BOX
INTERVAL HPI/OVERNIGHT EVENTS: elaine o/n    SUBJECTIVE: Patient seen and examined at bedside.   Titrated to RA - satting 93-94% at rest. Pt reports pain is controlled. Ambulating to bathroom wo LH/Dizziness, chest pain, dyspnea. Eating wo N/V/Abd pain. Voiding. Passing BMs.     OBJECTIVE:    VITAL SIGNS:  ICU Vital Signs Last 24 Hrs  T(C): 37.1 (14 Aug 2023 08:15), Max: 37.1 (14 Aug 2023 08:15)  T(F): 98.7 (14 Aug 2023 08:15), Max: 98.7 (14 Aug 2023 08:15)  HR: 70 (14 Aug 2023 08:15) (70 - 89)  BP: 120/83 (14 Aug 2023 08:15) (101/68 - 120/83)  BP(mean): --  ABP: --  ABP(mean): --  RR: 17 (14 Aug 2023 08:15) (17 - 18)  SpO2: 98% (14 Aug 2023 08:15) (95% - 98%)    O2 Parameters below as of 14 Aug 2023 08:15  Patient On (Oxygen Delivery Method): room air              CAPILLARY BLOOD GLUCOSE          PHYSICAL EXAM:  GEN: female in NAD on RA  HEENT: NC/AT, MMM  CV: RRR, nml S1S2, no murmurs  PULM: nml effort, CTAB  ABD: Soft, non-distended, NABS, non-tender  NEURO  A/O x3, moving all extremities, 5/5 in BLE. Sensation intact  Lumbar dressing c/d/i.   PSYCH: Appropriate      MEDICATIONS:  MEDICATIONS  (STANDING):  acetaminophen     Tablet .. 1000 milliGRAM(s) Oral every 8 hours  atorvastatin 80 milliGRAM(s) Oral daily  budesonide 160 MICROgram(s)/formoterol 4.5 MICROgram(s) Inhaler 2 Puff(s) Inhalation daily  enoxaparin Injectable 40 milliGRAM(s) SubCutaneous every 24 hours  gabapentin 600 milliGRAM(s) Oral at bedtime  lidocaine 1% Injectable 5 milliLiter(s) Local Injection once  losartan 50 milliGRAM(s) Oral daily  ondansetron   Disintegrating Tablet 4 milliGRAM(s) Oral every 6 hours  polyethylene glycol 3350 17 Gram(s) Oral daily  senna 2 Tablet(s) Oral at bedtime  triamcinolone    acetonide 40 mG/mL Injectable (KENALOG-40) 40 milliGRAM(s) IntraArticular once    MEDICATIONS  (PRN):  aluminum hydroxide/magnesium hydroxide/simethicone Suspension 30 milliLiter(s) Oral every 4 hours PRN Dyspepsia  benzocaine/menthol Lozenge 1 Lozenge Oral four times a day PRN Sore Throat  HYDROmorphone   Tablet 2 milliGRAM(s) Oral every 4 hours PRN Moderate Pain (4 - 6)  HYDROmorphone   Tablet 4 milliGRAM(s) Oral every 4 hours PRN Severe Pain (7 - 10)  lactulose Syrup 10 Gram(s) Oral three times a day PRN constipation  melatonin 5 milliGRAM(s) Oral at bedtime PRN Sleep  metoclopramide Injectable 10 milliGRAM(s) IV Push every 8 hours PRN for nausea or vomiting, second line      ALLERGIES:  Allergies    environmental allergy.....sneezing;  itchy eyes (Other)  lisinopril (Other)    Intolerances        LABS:                        10.9   10.39 )-----------( 302      ( 14 Aug 2023 07:31 )             33.4     08-14    136  |  100  |  15  ----------------------------<  101<H>  4.5   |  27  |  0.81    Ca    9.1      14 Aug 2023 07:31        Urinalysis Basic - ( 14 Aug 2023 07:31 )    Color: x / Appearance: x / SG: x / pH: x  Gluc: 101 mg/dL / Ketone: x  / Bili: x / Urobili: x   Blood: x / Protein: x / Nitrite: x   Leuk Esterase: x / RBC: x / WBC x   Sq Epi: x / Non Sq Epi: x / Bacteria: x        RADIOLOGY & ADDITIONAL TESTS: Reviewed.

## 2023-08-14 NOTE — PROGRESS NOTE ADULT - SUBJECTIVE AND OBJECTIVE BOX
Ortho Note    Pt seen and examined at bedside. No complaints. States she has been using her incentive spirometer  Denies CP, SOB, N/V, numbness/tingling       Vital Signs Last 24 Hrs  T(C): 36.6 (14 Aug 2023 04:55), Max: 36.9 (13 Aug 2023 08:25)  T(F): 97.8 (14 Aug 2023 04:55), Max: 98.5 (13 Aug 2023 08:25)  HR: 70 (14 Aug 2023 04:55) (70 - 89)  BP: 101/68 (14 Aug 2023 04:55) (101/68 - 116/78)  BP(mean): --  RR: 18 (14 Aug 2023 04:55) (17 - 18)  SpO2: 98% (14 Aug 2023 04:55) (94% - 98%)    Parameters below as of 14 Aug 2023 04:55  Patient On (Oxygen Delivery Method): room air          General: Pt Alert and oriented, NAD  DSG: telfa and tegaderm to lumbar spine  Pulses: +2 DP palpable pulses   Sensation: SILT to L2-S1 dermatomes bilaterally   Motor: EHL/FHL/TA/GS 5/5 bilaterally              LABS:                          12.7   13.23 )-----------( 315      ( 13 Aug 2023 10:36 )             40.1     08-13    137  |  98  |  17  ----------------------------<  128<H>  4.0   |  30  |  0.80    Ca    10.2      13 Aug 2023 10:36          Urinalysis Basic - ( 13 Aug 2023 10:36 )    Color: x / Appearance: x / SG: x / pH: x  Gluc: 128 mg/dL / Ketone: x  / Bili: x / Urobili: x   Blood: x / Protein: x / Nitrite: x   Leuk Esterase: x / RBC: x / WBC x   Sq Epi: x / Non Sq Epi: x / Bacteria: x        CXR shows focal atelectasis    A/P: 67yFemale POD#7 s/p L4-5 TLIF  - Stable  - Medicine co-management recommendations appreciated  - Trend WBC  - Atelectasis - encourage IS use  - Pain Control  - Bowel regimen: senna at bedtime and miralax 17g qd; lactulose 10mg tid prn   - DVT ppx: LVX 40mg daily   - PT, WBS: WBAT, OOB  - bilateral LE doppler negative for DVT  DISPO: FIORELLA Verde Pager [phone]

## 2023-08-15 ENCOUNTER — TRANSCRIPTION ENCOUNTER (OUTPATIENT)
Age: 68
End: 2023-08-15

## 2023-08-15 LAB
ANION GAP SERPL CALC-SCNC: 8 MMOL/L — SIGNIFICANT CHANGE UP (ref 5–17)
BUN SERPL-MCNC: 13 MG/DL — SIGNIFICANT CHANGE UP (ref 7–23)
CALCIUM SERPL-MCNC: 9.8 MG/DL — SIGNIFICANT CHANGE UP (ref 8.4–10.5)
CHLORIDE SERPL-SCNC: 100 MMOL/L — SIGNIFICANT CHANGE UP (ref 96–108)
CO2 SERPL-SCNC: 28 MMOL/L — SIGNIFICANT CHANGE UP (ref 22–31)
CREAT SERPL-MCNC: 0.9 MG/DL — SIGNIFICANT CHANGE UP (ref 0.5–1.3)
EGFR: 71 ML/MIN/1.73M2 — SIGNIFICANT CHANGE UP
GLUCOSE SERPL-MCNC: 95 MG/DL — SIGNIFICANT CHANGE UP (ref 70–99)
HCT VFR BLD CALC: 36.3 % — SIGNIFICANT CHANGE UP (ref 34.5–45)
HGB BLD-MCNC: 11.8 G/DL — SIGNIFICANT CHANGE UP (ref 11.5–15.5)
MCHC RBC-ENTMCNC: 30.2 PG — SIGNIFICANT CHANGE UP (ref 27–34)
MCHC RBC-ENTMCNC: 32.5 GM/DL — SIGNIFICANT CHANGE UP (ref 32–36)
MCV RBC AUTO: 92.8 FL — SIGNIFICANT CHANGE UP (ref 80–100)
NRBC # BLD: 0 /100 WBCS — SIGNIFICANT CHANGE UP (ref 0–0)
PLATELET # BLD AUTO: 331 K/UL — SIGNIFICANT CHANGE UP (ref 150–400)
POTASSIUM SERPL-MCNC: 4.5 MMOL/L — SIGNIFICANT CHANGE UP (ref 3.5–5.3)
POTASSIUM SERPL-SCNC: 4.5 MMOL/L — SIGNIFICANT CHANGE UP (ref 3.5–5.3)
RBC # BLD: 3.91 M/UL — SIGNIFICANT CHANGE UP (ref 3.8–5.2)
RBC # FLD: 13.9 % — SIGNIFICANT CHANGE UP (ref 10.3–14.5)
SODIUM SERPL-SCNC: 136 MMOL/L — SIGNIFICANT CHANGE UP (ref 135–145)
WBC # BLD: 11.14 K/UL — HIGH (ref 3.8–10.5)
WBC # FLD AUTO: 11.14 K/UL — HIGH (ref 3.8–10.5)

## 2023-08-15 PROCEDURE — 99231 SBSQ HOSP IP/OBS SF/LOW 25: CPT

## 2023-08-15 RX ORDER — BUDESONIDE AND FORMOTEROL FUMARATE DIHYDRATE 160; 4.5 UG/1; UG/1
2 AEROSOL RESPIRATORY (INHALATION)
Qty: 0 | Refills: 0 | DISCHARGE

## 2023-08-15 RX ORDER — OXYCODONE HYDROCHLORIDE 5 MG/1
1 TABLET ORAL
Qty: 30 | Refills: 0
Start: 2023-08-15

## 2023-08-15 RX ORDER — FLUTICASONE PROPIONATE 50 MCG
1 SPRAY, SUSPENSION NASAL
Qty: 0 | Refills: 0 | DISCHARGE

## 2023-08-15 RX ORDER — ACETAMINOPHEN 500 MG
2 TABLET ORAL
Qty: 0 | Refills: 0 | DISCHARGE

## 2023-08-15 RX ORDER — SENNA PLUS 8.6 MG/1
2 TABLET ORAL
Qty: 14 | Refills: 0
Start: 2023-08-15 | End: 2023-08-21

## 2023-08-15 RX ADMIN — HYDROMORPHONE HYDROCHLORIDE 4 MILLIGRAM(S): 2 INJECTION INTRAMUSCULAR; INTRAVENOUS; SUBCUTANEOUS at 08:14

## 2023-08-15 RX ADMIN — BUDESONIDE AND FORMOTEROL FUMARATE DIHYDRATE 2 PUFF(S): 160; 4.5 AEROSOL RESPIRATORY (INHALATION) at 13:16

## 2023-08-15 RX ADMIN — HYDROMORPHONE HYDROCHLORIDE 4 MILLIGRAM(S): 2 INJECTION INTRAMUSCULAR; INTRAVENOUS; SUBCUTANEOUS at 11:27

## 2023-08-15 RX ADMIN — Medication 5 MILLIGRAM(S): at 22:53

## 2023-08-15 RX ADMIN — HYDROMORPHONE HYDROCHLORIDE 4 MILLIGRAM(S): 2 INJECTION INTRAMUSCULAR; INTRAVENOUS; SUBCUTANEOUS at 07:14

## 2023-08-15 RX ADMIN — Medication 1000 MILLIGRAM(S): at 05:25

## 2023-08-15 RX ADMIN — HYDROMORPHONE HYDROCHLORIDE 4 MILLIGRAM(S): 2 INJECTION INTRAMUSCULAR; INTRAVENOUS; SUBCUTANEOUS at 16:51

## 2023-08-15 RX ADMIN — Medication 1000 MILLIGRAM(S): at 13:15

## 2023-08-15 RX ADMIN — HYDROMORPHONE HYDROCHLORIDE 4 MILLIGRAM(S): 2 INJECTION INTRAMUSCULAR; INTRAVENOUS; SUBCUTANEOUS at 21:50

## 2023-08-15 RX ADMIN — ENOXAPARIN SODIUM 40 MILLIGRAM(S): 100 INJECTION SUBCUTANEOUS at 21:53

## 2023-08-15 RX ADMIN — HYDROMORPHONE HYDROCHLORIDE 4 MILLIGRAM(S): 2 INJECTION INTRAMUSCULAR; INTRAVENOUS; SUBCUTANEOUS at 12:27

## 2023-08-15 RX ADMIN — Medication 1000 MILLIGRAM(S): at 21:49

## 2023-08-15 RX ADMIN — HYDROMORPHONE HYDROCHLORIDE 4 MILLIGRAM(S): 2 INJECTION INTRAMUSCULAR; INTRAVENOUS; SUBCUTANEOUS at 22:50

## 2023-08-15 RX ADMIN — LOSARTAN POTASSIUM 50 MILLIGRAM(S): 100 TABLET, FILM COATED ORAL at 21:49

## 2023-08-15 RX ADMIN — HYDROMORPHONE HYDROCHLORIDE 4 MILLIGRAM(S): 2 INJECTION INTRAMUSCULAR; INTRAVENOUS; SUBCUTANEOUS at 17:51

## 2023-08-15 RX ADMIN — GABAPENTIN 600 MILLIGRAM(S): 400 CAPSULE ORAL at 21:48

## 2023-08-15 RX ADMIN — Medication 1000 MILLIGRAM(S): at 06:25

## 2023-08-15 RX ADMIN — ATORVASTATIN CALCIUM 80 MILLIGRAM(S): 80 TABLET, FILM COATED ORAL at 21:49

## 2023-08-15 RX ADMIN — Medication 1000 MILLIGRAM(S): at 22:49

## 2023-08-15 NOTE — PROGRESS NOTE ADULT - SUBJECTIVE AND OBJECTIVE BOX
Ortho Note    Patient seen and examined at bedside.  Denies CP, SOB, N/V, new numbness/tingling     Vital Signs Last 24 Hrs  T(C): 37.1 (15 Aug 2023 04:46), Max: 37.1 (14 Aug 2023 08:15)  T(F): 98.8 (15 Aug 2023 04:46), Max: 98.8 (15 Aug 2023 04:46)  HR: 75 (15 Aug 2023 04:46) (70 - 83)  BP: 94/64 (15 Aug 2023 04:46) (94/64 - 120/83)  BP(mean): --  RR: 19 (15 Aug 2023 04:46) (17 - 19)  SpO2: 97% (15 Aug 2023 04:46) (94% - 98%)    Parameters below as of 15 Aug 2023 04:46  Patient On (Oxygen Delivery Method): room air        General: Pt Alert and oriented, NAD  DSG C/D/I- abdominal pad, tegaderm to lumbar spine  Pulses: 2+ DP pulses palpable bilaterally, skin wwp, cap refill brisk. No calf tenderness.  Sensation: SILT to L2-S1 dermatomes bilaterally.   Motor: EHL/FHL/TA/GS 5/5 bilaterally                 LABS:                          10.9   10.39 )-----------( 302      ( 14 Aug 2023 07:31 )             33.4     08-14    136  |  100  |  15  ----------------------------<  101<H>  4.5   |  27  |  0.81    Ca    9.1      14 Aug 2023 07:31          Urinalysis Basic - ( 14 Aug 2023 07:31 )    Color: x / Appearance: x / SG: x / pH: x  Gluc: 101 mg/dL / Ketone: x  / Bili: x / Urobili: x   Blood: x / Protein: x / Nitrite: x   Leuk Esterase: x / RBC: x / WBC x   Sq Epi: x / Non Sq Epi: x / Bacteria: x            A/P: 66yo Female POD#8 s/p L4-5 TLIF   - Stable, appreciate medical comanagement  - Post op atelectasis - resolved. O2 sat 98% on RA. WBC downtrending, 10.39 (13.23)  - Pain Control  - OOB/IS  - Bowel Regimen   - DVT ppx: LVX 40mg daily  - PT, WBS: WBAT  - Dispo: FIORELLA pending acceptance/auth    Ortho Pager [phone]

## 2023-08-15 NOTE — PROGRESS NOTE ADULT - ASSESSMENT
67F w preDM (6), here for elective TLIF L4-L5 w Dr. Madera 8/7, post-op c/b acute hypoxemia d/t atelectasis, now resolved.     #Acute hypoxemic respiratory failure - d/t atelectasis - resolved    #Post-op state. Pain controlled. PPx; SQL. On bowel regimen and incentive spirometer  #Lumbar radiculopathy - mgmt per ortho   - scheduled tylenol 1g q8, gabapentin 600 HS   - PRN: dilaudid 2mg/4mg  PO q4h for MOD/SEVERE    #Blood loss anemia - 11.8 today. Asymptomatic  #Leukocytosis - likely reactive. Non toxic appearing  #HTN - c/w losartan 50 daily. Home on olmesartan - hctz 20-12.5 daily  #HLD - c/w home lipitor  #Asthma - PRN symbicort - does not appear in exacerbation  #Obesity - BMI 34. Affects all aspects of care    Plan  Overall doing well. .     DISPO: Home w HPT

## 2023-08-15 NOTE — DISCHARGE NOTE NURSING/CASE MANAGEMENT/SOCIAL WORK - PATIENT PORTAL LINK FT
You can access the FollowMyHealth Patient Portal offered by [facility] by registering at the following website: [email or web address]. By joining [facility]’s FollowMyHealth portal, you will also be able to view your health information using other applications (apps) compatible with our system.

## 2023-08-15 NOTE — PROGRESS NOTE ADULT - SUBJECTIVE AND OBJECTIVE BOX
Ortho Note    Patient seen and examined at bedside. Pt comfortable without complaints, pain controlled. She states she is hopeful to go to a rehab but will accept going home if she gets denied. Voiding freely, LBM on 8/13, passing flatus.   Denies CP, SOB, N/V, new numbness/tingling     Vital Signs Last 24 Hrs  T(C): 37.1 (08-15-23 @ 04:46), Max: 37.1 (08-15-23 @ 04:46)  T(F): 98.8 (08-15-23 @ 04:46), Max: 98.8 (08-15-23 @ 04:46)  HR: 75 (08-15-23 @ 04:46) (75 - 75)  BP: 94/64 (08-15-23 @ 04:46) (94/64 - 94/64)  BP(mean): --  RR: 19 (08-15-23 @ 04:46) (19 - 19)  SpO2: 97% (08-15-23 @ 04:46) (97% - 97%)  I&O's Summary      General: Pt Alert and oriented, NAD  DSG C/D/I- Abdominal pad, tegaderm to lumbar spine  Pulses: 2+ DP pulses palpable bilaterally, skin wwp, cap refill brisk, no calf tenderness  Sensation: SILT to L2-S1 dermatomes bilaterally  Motor: EHL/FHL/TA/GS 5/5 bilaterally                          11.8   11.14 )-----------( 331      ( 15 Aug 2023 05:30 )             36.3     08-15    136  |  100  |  13  ----------------------------<  95  4.5   |  28  |  0.90    Ca    9.8      15 Aug 2023 05:30        A/P: 68yo Female POD#8 s/p L4-5 TLIF   - Stable, appreciate medical comanagement  - Pain Control  - OOB/IS  - Bowel Regimen  - DVT ppx: LVX 40mg daily  - PT, WBS: WBAT  - Dispo: FIORELLA vs. HPT    Ortho Pager [phone]

## 2023-08-16 VITALS
TEMPERATURE: 98 F | DIASTOLIC BLOOD PRESSURE: 74 MMHG | OXYGEN SATURATION: 93 % | SYSTOLIC BLOOD PRESSURE: 110 MMHG | RESPIRATION RATE: 17 BRPM | HEART RATE: 71 BPM

## 2023-08-16 LAB
ANION GAP SERPL CALC-SCNC: 6 MMOL/L — SIGNIFICANT CHANGE UP (ref 5–17)
BUN SERPL-MCNC: 12 MG/DL — SIGNIFICANT CHANGE UP (ref 7–23)
CALCIUM SERPL-MCNC: 9.5 MG/DL — SIGNIFICANT CHANGE UP (ref 8.4–10.5)
CHLORIDE SERPL-SCNC: 100 MMOL/L — SIGNIFICANT CHANGE UP (ref 96–108)
CO2 SERPL-SCNC: 28 MMOL/L — SIGNIFICANT CHANGE UP (ref 22–31)
CREAT SERPL-MCNC: 1.01 MG/DL — SIGNIFICANT CHANGE UP (ref 0.5–1.3)
EGFR: 61 ML/MIN/1.73M2 — SIGNIFICANT CHANGE UP
GLUCOSE SERPL-MCNC: 97 MG/DL — SIGNIFICANT CHANGE UP (ref 70–99)
HCT VFR BLD CALC: 33.9 % — LOW (ref 34.5–45)
HGB BLD-MCNC: 11.1 G/DL — LOW (ref 11.5–15.5)
MCHC RBC-ENTMCNC: 30.7 PG — SIGNIFICANT CHANGE UP (ref 27–34)
MCHC RBC-ENTMCNC: 32.7 GM/DL — SIGNIFICANT CHANGE UP (ref 32–36)
MCV RBC AUTO: 93.6 FL — SIGNIFICANT CHANGE UP (ref 80–100)
NRBC # BLD: 0 /100 WBCS — SIGNIFICANT CHANGE UP (ref 0–0)
PLATELET # BLD AUTO: 333 K/UL — SIGNIFICANT CHANGE UP (ref 150–400)
POTASSIUM SERPL-MCNC: 4.6 MMOL/L — SIGNIFICANT CHANGE UP (ref 3.5–5.3)
POTASSIUM SERPL-SCNC: 4.6 MMOL/L — SIGNIFICANT CHANGE UP (ref 3.5–5.3)
RBC # BLD: 3.62 M/UL — LOW (ref 3.8–5.2)
RBC # FLD: 13.6 % — SIGNIFICANT CHANGE UP (ref 10.3–14.5)
SODIUM SERPL-SCNC: 134 MMOL/L — LOW (ref 135–145)
WBC # BLD: 10.07 K/UL — SIGNIFICANT CHANGE UP (ref 3.8–10.5)
WBC # FLD AUTO: 10.07 K/UL — SIGNIFICANT CHANGE UP (ref 3.8–10.5)

## 2023-08-16 PROCEDURE — 83036 HEMOGLOBIN GLYCOSYLATED A1C: CPT

## 2023-08-16 PROCEDURE — 97165 OT EVAL LOW COMPLEX 30 MIN: CPT

## 2023-08-16 PROCEDURE — 85025 COMPLETE CBC W/AUTO DIFF WBC: CPT

## 2023-08-16 PROCEDURE — 97530 THERAPEUTIC ACTIVITIES: CPT

## 2023-08-16 PROCEDURE — 71046 X-RAY EXAM CHEST 2 VIEWS: CPT

## 2023-08-16 PROCEDURE — 93970 EXTREMITY STUDY: CPT

## 2023-08-16 PROCEDURE — C1713: CPT

## 2023-08-16 PROCEDURE — 97164 PT RE-EVAL EST PLAN CARE: CPT

## 2023-08-16 PROCEDURE — 36415 COLL VENOUS BLD VENIPUNCTURE: CPT

## 2023-08-16 PROCEDURE — 97161 PT EVAL LOW COMPLEX 20 MIN: CPT

## 2023-08-16 PROCEDURE — 85027 COMPLETE CBC AUTOMATED: CPT

## 2023-08-16 PROCEDURE — 73560 X-RAY EXAM OF KNEE 1 OR 2: CPT

## 2023-08-16 PROCEDURE — 86850 RBC ANTIBODY SCREEN: CPT

## 2023-08-16 PROCEDURE — C1889: CPT

## 2023-08-16 PROCEDURE — 97110 THERAPEUTIC EXERCISES: CPT

## 2023-08-16 PROCEDURE — 86900 BLOOD TYPING SEROLOGIC ABO: CPT

## 2023-08-16 PROCEDURE — 97535 SELF CARE MNGMENT TRAINING: CPT

## 2023-08-16 PROCEDURE — 97116 GAIT TRAINING THERAPY: CPT

## 2023-08-16 PROCEDURE — 76000 FLUOROSCOPY <1 HR PHYS/QHP: CPT

## 2023-08-16 PROCEDURE — 72100 X-RAY EXAM L-S SPINE 2/3 VWS: CPT

## 2023-08-16 PROCEDURE — 80048 BASIC METABOLIC PNL TOTAL CA: CPT

## 2023-08-16 PROCEDURE — 94640 AIRWAY INHALATION TREATMENT: CPT

## 2023-08-16 PROCEDURE — 86901 BLOOD TYPING SEROLOGIC RH(D): CPT

## 2023-08-16 RX ADMIN — HYDROMORPHONE HYDROCHLORIDE 4 MILLIGRAM(S): 2 INJECTION INTRAMUSCULAR; INTRAVENOUS; SUBCUTANEOUS at 11:38

## 2023-08-16 RX ADMIN — Medication 1000 MILLIGRAM(S): at 06:17

## 2023-08-16 RX ADMIN — HYDROMORPHONE HYDROCHLORIDE 4 MILLIGRAM(S): 2 INJECTION INTRAMUSCULAR; INTRAVENOUS; SUBCUTANEOUS at 06:45

## 2023-08-16 RX ADMIN — HYDROMORPHONE HYDROCHLORIDE 4 MILLIGRAM(S): 2 INJECTION INTRAMUSCULAR; INTRAVENOUS; SUBCUTANEOUS at 07:45

## 2023-08-16 RX ADMIN — Medication 1000 MILLIGRAM(S): at 05:17

## 2023-08-16 NOTE — PROGRESS NOTE ADULT - SUBJECTIVE AND OBJECTIVE BOX
Ortho Note    Patient seen and examined at bedside. Pt comfortable without complaints, pain controlled.   Denies CP, SOB, N/V, new numbness/tingling     Vital Signs Last 24 Hrs  T(C): 36.8 (16 Aug 2023 04:52), Max: 37.6 (15 Aug 2023 20:40)  T(F): 98.2 (16 Aug 2023 04:52), Max: 99.7 (15 Aug 2023 20:40)  HR: 78 (16 Aug 2023 04:52) (78 - 97)  BP: 95/64 (16 Aug 2023 04:52) (95/64 - 115/78)  BP(mean): --  RR: 16 (16 Aug 2023 04:52) (16 - 19)  SpO2: 97% (16 Aug 2023 04:52) (92% - 97%)    Parameters below as of 16 Aug 2023 04:52  Patient On (Oxygen Delivery Method): room air        LABS:                          11.8   11.14 )-----------( 331      ( 15 Aug 2023 05:30 )             36.3     08-15    136  |  100  |  13  ----------------------------<  95  4.5   |  28  |  0.90    Ca    9.8      15 Aug 2023 05:30          Urinalysis Basic - ( 15 Aug 2023 05:30 )    Color: x / Appearance: x / SG: x / pH: x  Gluc: 95 mg/dL / Ketone: x  / Bili: x / Urobili: x   Blood: x / Protein: x / Nitrite: x   Leuk Esterase: x / RBC: x / WBC x   Sq Epi: x / Non Sq Epi: x / Bacteria: x          General: Pt Alert and oriented, NAD  Pulses: 2+ DP pulses palpable bilaterally, skin wwp, cap refill brisk, no calf tenderness  Sensation: SILT to L2-S1 dermatomes bilaterally  Motor: EHL/FHL/TA/GS 5/5 bilaterally      A/P: 68yo Female POD#9 s/p L4-5 TLIF   - Stable, appreciate medical comanagement  - Pain Control  - OOB/IS  - Bowel Regimen  - DVT ppx: LVX 40mg daily  - PT, WBS: WBAT  - Dispo: FIORELLA today    Ortho Pager [phone]

## 2023-08-16 NOTE — PROGRESS NOTE ADULT - PROVIDER SPECIALTY LIST ADULT
Orthopedics
Hospitalist
Orthopedics
Hospitalist
Hospitalist
Orthopedics
Hospitalist
Hospitalist

## 2023-08-16 NOTE — PROGRESS NOTE ADULT - REASON FOR ADMISSION
lower back pain

## 2023-08-17 PROBLEM — M48.062 SPINAL STENOSIS, LUMBAR REGION WITH NEUROGENIC CLAUDICATION: Chronic | Status: ACTIVE | Noted: 2023-08-04

## 2023-08-17 PROBLEM — M54.16 RADICULOPATHY, LUMBAR REGION: Chronic | Status: ACTIVE | Noted: 2023-08-04

## 2023-08-17 PROBLEM — Z87.39 PERSONAL HISTORY OF OTHER DISEASES OF THE MUSCULOSKELETAL SYSTEM AND CONNECTIVE TISSUE: Chronic | Status: ACTIVE | Noted: 2023-08-04

## 2023-08-17 PROBLEM — Z86.69 PERSONAL HISTORY OF OTHER DISEASES OF THE NERVOUS SYSTEM AND SENSE ORGANS: Chronic | Status: ACTIVE | Noted: 2023-08-04

## 2023-08-17 PROBLEM — M43.10 SPONDYLOLISTHESIS, SITE UNSPECIFIED: Chronic | Status: ACTIVE | Noted: 2023-08-04

## 2023-08-23 ENCOUNTER — APPOINTMENT (OUTPATIENT)
Dept: ORTHOPEDIC SURGERY | Facility: CLINIC | Age: 68
End: 2023-08-23
Payer: MEDICARE

## 2023-08-23 DIAGNOSIS — M48.062 SPINAL STENOSIS, LUMBAR REGION WITH NEUROGENIC CLAUDICATION: ICD-10-CM

## 2023-08-23 DIAGNOSIS — M54.16 RADICULOPATHY, LUMBAR REGION: ICD-10-CM

## 2023-08-23 DIAGNOSIS — I10 ESSENTIAL (PRIMARY) HYPERTENSION: ICD-10-CM

## 2023-08-23 DIAGNOSIS — R73.03 PREDIABETES: ICD-10-CM

## 2023-08-23 DIAGNOSIS — J98.11 ATELECTASIS: ICD-10-CM

## 2023-08-23 DIAGNOSIS — J95.89 OTHER POSTPROCEDURAL COMPLICATIONS AND DISORDERS OF RESPIRATORY SYSTEM, NOT ELSEWHERE CLASSIFIED: ICD-10-CM

## 2023-08-23 DIAGNOSIS — M43.16 SPONDYLOLISTHESIS, LUMBAR REGION: ICD-10-CM

## 2023-08-23 DIAGNOSIS — J32.9 CHRONIC SINUSITIS, UNSPECIFIED: ICD-10-CM

## 2023-08-23 DIAGNOSIS — I95.9 HYPOTENSION, UNSPECIFIED: ICD-10-CM

## 2023-08-23 DIAGNOSIS — E78.5 HYPERLIPIDEMIA, UNSPECIFIED: ICD-10-CM

## 2023-08-23 DIAGNOSIS — D72.829 ELEVATED WHITE BLOOD CELL COUNT, UNSPECIFIED: ICD-10-CM

## 2023-08-23 DIAGNOSIS — R09.02 HYPOXEMIA: ICD-10-CM

## 2023-08-23 PROCEDURE — 72100 X-RAY EXAM L-S SPINE 2/3 VWS: CPT

## 2023-08-23 PROCEDURE — 99024 POSTOP FOLLOW-UP VISIT: CPT

## 2023-08-23 RX ORDER — OXYCODONE 5 MG/1
5 TABLET ORAL EVERY 8 HOURS
Qty: 42 | Refills: 0 | Status: ACTIVE | COMMUNITY
Start: 2023-08-23 | End: 1900-01-01

## 2023-08-23 RX ORDER — GABAPENTIN 300 MG/1
300 CAPSULE ORAL
Qty: 30 | Refills: 1 | Status: ACTIVE | COMMUNITY
Start: 2023-08-23 | End: 1900-01-01

## 2023-08-23 NOTE — HISTORY OF PRESENT ILLNESS
[Chills] : no chills [Constipation] : no constipation [Diarrhea] : no diarrhea [Dysuria] : no dysuria [Fever] : no fever [Nausea] : no nausea [Vomiting] : no vomiting [de-identified] : L4-L5 TLIF (8/7/23) [de-identified] : 67 year old female s/p L4-L5 TLIF (8/7/23). Radicular pain resolved. She denies radicular pain, recent illness, fevers, numbness, weakness, balance problems, saddle anesthesia, urinary retention or fecal incontinence.  Surgical site pain controlled on medications. [de-identified] : MSK: Lumbar spine: incision c/d/i NEURO EXAM: Sensation  Left L2  -  2/2             Left L3  -  2/2 Left L4  -  2/2 Left L5  -  2/2 Left S1  -  2/2  Right L2  -  2/2             Right L3  -  2/2 Right L4  -  2/2 Right L5  -  2/2 Right S1  -  2/2  Motor:  Left L2 (hip flexion)                            5/5                 Left L3 (knee extension)                   5/5                 Left L4 (ankle dorsiflexion)                 5/5                 Left L5 (long toe extensor)                5/5                 Left S1 (ankle plantar flexion)           5/5  Right L2 (hip flexion)                            5/5                 Right L3 (knee extension)                   5/5                 Right L4 (ankle dorsiflexion)                 5/5                 Right L5 (long toe extensor)                5/5                 Right S1 (ankle plantar flexion)           5/5 [de-identified] : I ordered radiographs to evaluate the patient's symptoms. Lumbar 4 view radiographs taken in the office today show no dislocation or fracture.  s/p L4-L5 TLIF with hardware in appropriate position and alignment [de-identified] : 67 year old female s/p L4-L5 TLIF (8/7/23).  [de-identified] : Patient is progressing well.  Continue no lifting > 10 lbs, twisting or bending.  Followup in 4 weeks. We discussed red flag symptoms that would require emergent evaluation. She knows to call with any questions or concerns or if her symptoms acutely worsen.

## 2023-09-20 ENCOUNTER — APPOINTMENT (OUTPATIENT)
Dept: ORTHOPEDIC SURGERY | Facility: CLINIC | Age: 68
End: 2023-09-20
Payer: MEDICARE

## 2023-09-20 PROCEDURE — 99024 POSTOP FOLLOW-UP VISIT: CPT

## 2023-09-20 PROCEDURE — 72100 X-RAY EXAM L-S SPINE 2/3 VWS: CPT

## 2023-09-23 ENCOUNTER — NON-APPOINTMENT (OUTPATIENT)
Age: 68
End: 2023-09-23

## 2023-09-27 RX ORDER — OXYCODONE 5 MG/1
5 TABLET ORAL
Qty: 28 | Refills: 0 | Status: ACTIVE | COMMUNITY
Start: 2023-09-27 | End: 1900-01-01

## 2023-11-07 ENCOUNTER — APPOINTMENT (OUTPATIENT)
Dept: ORTHOPEDIC SURGERY | Facility: CLINIC | Age: 68
End: 2023-11-07
Payer: MEDICARE

## 2023-11-07 PROCEDURE — 72110 X-RAY EXAM L-2 SPINE 4/>VWS: CPT

## 2023-11-07 PROCEDURE — 99214 OFFICE O/P EST MOD 30 MIN: CPT

## 2023-11-07 RX ORDER — METHOCARBAMOL 750 MG/1
750 TABLET, FILM COATED ORAL 3 TIMES DAILY
Qty: 42 | Refills: 0 | Status: ACTIVE | COMMUNITY
Start: 2023-11-07 | End: 1900-01-01

## 2023-11-22 ENCOUNTER — APPOINTMENT (OUTPATIENT)
Dept: ORTHOPEDIC SURGERY | Facility: CLINIC | Age: 68
End: 2023-11-22
Payer: MEDICARE

## 2023-11-22 PROCEDURE — 72050 X-RAY EXAM NECK SPINE 4/5VWS: CPT

## 2023-11-22 PROCEDURE — 99214 OFFICE O/P EST MOD 30 MIN: CPT

## 2023-12-07 ENCOUNTER — APPOINTMENT (OUTPATIENT)
Dept: ORTHOPEDIC SURGERY | Facility: CLINIC | Age: 68
End: 2023-12-07
Payer: MEDICARE

## 2023-12-07 DIAGNOSIS — M47.12 OTHER SPONDYLOSIS WITH MYELOPATHY, CERVICAL REGION: ICD-10-CM

## 2023-12-07 PROCEDURE — 99214 OFFICE O/P EST MOD 30 MIN: CPT

## 2023-12-07 RX ORDER — OXYCODONE 5 MG/1
5 TABLET ORAL
Qty: 14 | Refills: 0 | Status: ACTIVE | COMMUNITY
Start: 2023-12-07 | End: 1900-01-01

## 2023-12-11 PROBLEM — M47.12 CERVICAL SPONDYLITIC CORD COMPRESSION: Status: ACTIVE | Noted: 2023-11-22
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
## 2024-01-09 ENCOUNTER — RX RENEWAL (OUTPATIENT)
Age: 69
End: 2024-01-09

## 2024-01-09 RX ORDER — GABAPENTIN 300 MG/1
300 CAPSULE ORAL
Qty: 90 | Refills: 0 | Status: ACTIVE | COMMUNITY
Start: 2023-11-22 | End: 1900-01-01

## 2024-01-11 ENCOUNTER — APPOINTMENT (OUTPATIENT)
Dept: ORTHOPEDIC SURGERY | Facility: CLINIC | Age: 69
End: 2024-01-11
Payer: MEDICARE

## 2024-01-11 DIAGNOSIS — M54.12 RADICULOPATHY, CERVICAL REGION: ICD-10-CM

## 2024-01-11 DIAGNOSIS — M54.16 RADICULOPATHY, LUMBAR REGION: ICD-10-CM

## 2024-01-11 DIAGNOSIS — M48.062 SPINAL STENOSIS, LUMBAR REGION WITH NEUROGENIC CLAUDICATION: ICD-10-CM

## 2024-01-11 DIAGNOSIS — M43.16 SPONDYLOLISTHESIS, LUMBAR REGION: ICD-10-CM

## 2024-01-11 DIAGNOSIS — M54.50 LOW BACK PAIN, UNSPECIFIED: ICD-10-CM

## 2024-01-11 PROCEDURE — 99214 OFFICE O/P EST MOD 30 MIN: CPT

## 2024-01-11 NOTE — PHYSICAL EXAM
[de-identified] :  MRI of the cervical spine demonstrates:(01/06/24) Multilevel degenerative changes of the cervical spine contributing to mild spinal canal stenosis/cord impingement at C3-4, C5-6, and C6-7, and mild spinal canal stenosis at C7-T1. Mild to moderate multilevel foraminal stenoses, as detailed. Grade 1 retrolisthesis of C3 on C4. Mild multilevel facet arthrosis.

## 2024-02-11 PROBLEM — M43.16 SPONDYLOLISTHESIS AT L4-L5 LEVEL: Status: ACTIVE | Noted: 2023-06-15

## 2024-02-11 PROBLEM — M54.12 CERVICAL RADICULOPATHY: Status: ACTIVE | Noted: 2023-11-22

## 2024-02-11 PROBLEM — M54.16 LUMBAR RADICULOPATHY: Status: ACTIVE | Noted: 2023-05-30

## 2024-02-11 PROBLEM — M54.50 LOW BACK PAIN: Status: ACTIVE | Noted: 2023-05-30

## 2024-02-11 PROBLEM — M48.062 LUMBAR STENOSIS WITH NEUROGENIC CLAUDICATION: Status: ACTIVE | Noted: 2023-05-23

## 2024-02-13 ENCOUNTER — APPOINTMENT (OUTPATIENT)
Dept: ORTHOPEDIC SURGERY | Facility: CLINIC | Age: 69
End: 2024-02-13

## 2024-02-26 ENCOUNTER — APPOINTMENT (OUTPATIENT)
Dept: ORTHOPEDIC SURGERY | Facility: CLINIC | Age: 69
End: 2024-02-26

## 2024-08-05 ENCOUNTER — APPOINTMENT (OUTPATIENT)
Dept: INTERNAL MEDICINE | Facility: CLINIC | Age: 69
End: 2024-08-05

## 2024-08-06 ENCOUNTER — APPOINTMENT (OUTPATIENT)
Dept: ORTHOPEDIC SURGERY | Facility: CLINIC | Age: 69
End: 2024-08-06

## 2024-08-07 ENCOUNTER — NON-APPOINTMENT (OUTPATIENT)
Age: 69
End: 2024-08-07

## 2024-08-16 ENCOUNTER — LABORATORY RESULT (OUTPATIENT)
Age: 69
End: 2024-08-16

## 2024-08-16 ENCOUNTER — APPOINTMENT (OUTPATIENT)
Dept: INTERNAL MEDICINE | Facility: CLINIC | Age: 69
End: 2024-08-16
Payer: MEDICARE

## 2024-08-16 VITALS
HEART RATE: 87 BPM | TEMPERATURE: 98.6 F | BODY MASS INDEX: 36.12 KG/M2 | HEIGHT: 60 IN | OXYGEN SATURATION: 95 % | WEIGHT: 184 LBS | DIASTOLIC BLOOD PRESSURE: 88 MMHG | SYSTOLIC BLOOD PRESSURE: 123 MMHG

## 2024-08-16 DIAGNOSIS — Z00.00 ENCOUNTER FOR GENERAL ADULT MEDICAL EXAMINATION W/OUT ABNORMAL FINDINGS: ICD-10-CM

## 2024-08-16 DIAGNOSIS — F41.9 ANXIETY DISORDER, UNSPECIFIED: ICD-10-CM

## 2024-08-16 DIAGNOSIS — Z12.39 ENCOUNTER FOR OTHER SCREENING FOR MALIGNANT NEOPLASM OF BREAST: ICD-10-CM

## 2024-08-16 DIAGNOSIS — G47.00 INSOMNIA, UNSPECIFIED: ICD-10-CM

## 2024-08-16 DIAGNOSIS — Z12.11 ENCOUNTER FOR SCREENING FOR MALIGNANT NEOPLASM OF COLON: ICD-10-CM

## 2024-08-16 DIAGNOSIS — E66.9 OBESITY, UNSPECIFIED: ICD-10-CM

## 2024-08-16 DIAGNOSIS — I10 ESSENTIAL (PRIMARY) HYPERTENSION: ICD-10-CM

## 2024-08-16 PROCEDURE — G0438: CPT

## 2024-08-16 PROCEDURE — 36415 COLL VENOUS BLD VENIPUNCTURE: CPT

## 2024-08-16 RX ORDER — SEMAGLUTIDE 0.25 MG/.5ML
0.25 INJECTION, SOLUTION SUBCUTANEOUS
Qty: 1 | Refills: 1 | Status: ACTIVE | COMMUNITY
Start: 2024-08-16 | End: 1900-01-01

## 2024-08-16 NOTE — HEALTH RISK ASSESSMENT
[Little interest or pleasure doing things] : 1) Little interest or pleasure doing things [Feeling down, depressed, or hopeless] : 2) Feeling down, depressed, or hopeless [0] : 2) Feeling down, depressed, or hopeless: Not at all (0) [PHQ-2 Negative - No further assessment needed] : PHQ-2 Negative - No further assessment needed [de-identified] : I spend 5 min performing a depression screening on this patient [[id number]] : 0 [Never] : Never

## 2024-08-16 NOTE — HISTORY OF PRESENT ILLNESS
[de-identified] : Ms. WEBER is a68 year F with PMH of HTN, HLD, obesity and insomnia who comes to establish care. Today c/o intermittent insomnia and weight gain for years, worse lately. Needs mammogram and referrals

## 2024-08-17 ENCOUNTER — NON-APPOINTMENT (OUTPATIENT)
Age: 69
End: 2024-08-17

## 2024-08-19 ENCOUNTER — RX RENEWAL (OUTPATIENT)
Age: 69
End: 2024-08-19

## 2024-08-19 LAB
25(OH)D3 SERPL-MCNC: 22.9 NG/ML
APPEARANCE: CLEAR
BILIRUBIN URINE: NEGATIVE
BLOOD URINE: NEGATIVE
CHOLEST SERPL-MCNC: 251 MG/DL
COLOR: YELLOW
GLUCOSE QUALITATIVE U: NEGATIVE MG/DL
HCV AB SER QL: NONREACTIVE
HCV S/CO RATIO: 0.16 S/CO
HDLC SERPL-MCNC: 51 MG/DL
KETONES URINE: NEGATIVE MG/DL
LDLC SERPL CALC-MCNC: 175 MG/DL
LEUKOCYTE ESTERASE URINE: ABNORMAL
NITRITE URINE: NEGATIVE
NONHDLC SERPL-MCNC: 200 MG/DL
PH URINE: 6
PROTEIN URINE: NEGATIVE MG/DL
SPECIFIC GRAVITY URINE: 1.02
T PALLIDUM AB SER QL IA: NEGATIVE
TRIGL SERPL-MCNC: 138 MG/DL
TSH SERPL-ACNC: 1.8 UIU/ML
UROBILINOGEN URINE: 0.2 MG/DL

## 2024-08-19 RX ORDER — QUETIAPINE FUMARATE 25 MG/1
25 TABLET ORAL
Qty: 10 | Refills: 0 | Status: ACTIVE | COMMUNITY
Start: 2024-08-16 | End: 1900-01-01

## 2024-08-20 ENCOUNTER — APPOINTMENT (OUTPATIENT)
Dept: ORTHOPEDIC SURGERY | Facility: CLINIC | Age: 69
End: 2024-08-20

## 2024-08-20 DIAGNOSIS — M43.16 SPONDYLOLISTHESIS, LUMBAR REGION: ICD-10-CM

## 2024-08-20 DIAGNOSIS — M54.16 RADICULOPATHY, LUMBAR REGION: ICD-10-CM

## 2024-08-20 DIAGNOSIS — M54.50 LOW BACK PAIN, UNSPECIFIED: ICD-10-CM

## 2024-08-20 PROCEDURE — 99214 OFFICE O/P EST MOD 30 MIN: CPT

## 2024-08-20 PROCEDURE — 72110 X-RAY EXAM L-2 SPINE 4/>VWS: CPT

## 2024-08-20 RX ORDER — METHYLPREDNISOLONE 4 MG/1
4 TABLET ORAL
Qty: 1 | Refills: 0 | Status: ACTIVE | COMMUNITY
Start: 2024-08-20 | End: 1900-01-01

## 2024-08-29 RX ORDER — TIRZEPATIDE 2.5 MG/.5ML
2.5 INJECTION, SOLUTION SUBCUTANEOUS
Qty: 1 | Refills: 1 | Status: ACTIVE | COMMUNITY
Start: 2024-08-29 | End: 1900-01-01

## 2024-08-30 NOTE — PHYSICAL EXAM
[de-identified] : General: No acute distress, conversant, well-nourished. Head: Normocephalic, atraumatic Neck: trachea midline, FROM Heart: normotensive and normal rate and rhythm Lungs: No labored breathing Skin: No abrasions, no rashes, no edema Psych: Alert and oriented to person, place and time Extremities: no peripheral edema or digital cyanosis Gait: Normal gait. Can perform tandem gait.   Vascular: warm and well perfused distally, palpable distal pulses MSK: Spine:  incision well healed  NEURO: Sensation           Left            C5     2/2                C6     2/2                C7     2/2                C8     2/2               T1     2/2                        Right          C5     2/2                C6     2/2                C7     2/2                C8     2/2               T1     2/2        Motor:                                                 Left              C5 (deltoid abduction)             5/5                C6 (biceps flexion)                   5/5                 C7 (triceps extension)             5/5                C8 (finger flexion)                     5/5                T1 (interosseous)                     5/5                                                            Right            C5 (deltoid abduction)             5/5                C6 (biceps flexion)                   5/5                 C7 (triceps extension)             5/5                C8 (finger flexion)                     5/5                T1 (interosseous)                     5/5                       Sensation  Left L2  -  2/2             Left L3  -  2/2 Left L4  -  2/2 Left L5  -  2/2 Left S1  -  2/2  Right L2  -  2/2             Right L3  -  2/2 Right L4  -  2/2 Right L5  -  2/2 Right S1  -  2/2  Motor:  Left L2 (hip flexion)                            5/5                 Left L3 (knee extension)                   5/5                 Left L4 (ankle dorsiflexion)                 5/5                 Left L5 (long toe extensor)                5/5                 Left S1 (ankle plantar flexion)           5/5  Right L2 (hip flexion)                            5/5                 Right L3 (knee extension)                   5/5                 Right L4 (ankle dorsiflexion)                 5/5                 Right L5 (long toe extensor)                5/5                 Right S1 (ankle plantar flexion)           5/5  Reflexes: Normal and symmetric Negative Spurlings test.  Negative Hoffmans reflex.   Negative clonus.  Down-going Babinski. [de-identified] : I ordered radiographs to evaluate the patient's symptoms. Lumbar 4 view radiographs taken in the office today show no dislocation or fracture.  s/p L4-L5 TLIF with hardware in appropriate position and alignment

## 2024-08-30 NOTE — PHYSICAL EXAM
[de-identified] : General: No acute distress, conversant, well-nourished. Head: Normocephalic, atraumatic Neck: trachea midline, FROM Heart: normotensive and normal rate and rhythm Lungs: No labored breathing Skin: No abrasions, no rashes, no edema Psych: Alert and oriented to person, place and time Extremities: no peripheral edema or digital cyanosis Gait: Normal gait. Can perform tandem gait.   Vascular: warm and well perfused distally, palpable distal pulses MSK: Spine:  incision well healed  NEURO: Sensation           Left            C5     2/2                C6     2/2                C7     2/2                C8     2/2               T1     2/2                        Right          C5     2/2                C6     2/2                C7     2/2                C8     2/2               T1     2/2        Motor:                                                 Left              C5 (deltoid abduction)             5/5                C6 (biceps flexion)                   5/5                 C7 (triceps extension)             5/5                C8 (finger flexion)                     5/5                T1 (interosseous)                     5/5                                                            Right            C5 (deltoid abduction)             5/5                C6 (biceps flexion)                   5/5                 C7 (triceps extension)             5/5                C8 (finger flexion)                     5/5                T1 (interosseous)                     5/5                       Sensation  Left L2  -  2/2             Left L3  -  2/2 Left L4  -  2/2 Left L5  -  2/2 Left S1  -  2/2  Right L2  -  2/2             Right L3  -  2/2 Right L4  -  2/2 Right L5  -  2/2 Right S1  -  2/2  Motor:  Left L2 (hip flexion)                            5/5                 Left L3 (knee extension)                   5/5                 Left L4 (ankle dorsiflexion)                 5/5                 Left L5 (long toe extensor)                5/5                 Left S1 (ankle plantar flexion)           5/5  Right L2 (hip flexion)                            5/5                 Right L3 (knee extension)                   5/5                 Right L4 (ankle dorsiflexion)                 5/5                 Right L5 (long toe extensor)                5/5                 Right S1 (ankle plantar flexion)           5/5  Reflexes: Normal and symmetric Negative Spurlings test.  Negative Hoffmans reflex.   Negative clonus.  Down-going Babinski. [de-identified] : I ordered radiographs to evaluate the patient's symptoms. Lumbar 4 view radiographs taken in the office today show no dislocation or fracture.  s/p L4-L5 TLIF with hardware in appropriate position and alignment
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
## 2024-09-05 ENCOUNTER — APPOINTMENT (OUTPATIENT)
Dept: GASTROENTEROLOGY | Facility: CLINIC | Age: 69
End: 2024-09-05
Payer: MEDICARE

## 2024-09-05 VITALS
OXYGEN SATURATION: 96 % | RESPIRATION RATE: 14 BRPM | WEIGHT: 188 LBS | HEART RATE: 80 BPM | HEIGHT: 60 IN | SYSTOLIC BLOOD PRESSURE: 130 MMHG | TEMPERATURE: 95.5 F | BODY MASS INDEX: 36.91 KG/M2 | DIASTOLIC BLOOD PRESSURE: 70 MMHG

## 2024-09-05 PROCEDURE — 99204 OFFICE O/P NEW MOD 45 MIN: CPT

## 2024-09-05 NOTE — HISTORY OF PRESENT ILLNESS
[FreeTextEntry1] : This is a 68 year old female with essential hypertension and obesity who presents today to obtain a screening colonoscopy. Patient describes feeling well and has not had recent changes in bowel habits, including stool frequency and consistency. Typically has 2-3 formed, non-bloody, non-painful bowel movement per day. Denies nausea, vomiting, abdominal pain, diarrhea, constipation, hematochezia, melena, and unintentional weight loss. Has never had colonoscopy in the past. Denies history of coronary artery disease or diabetes. Does not use blood thinning medications, insulin, or weight loss medications. Denies family history of colon cancer or advanced polyps. States that she plans to initiate wegovy for weight loss after colonoscopy completed.
patient/family

## 2024-09-05 NOTE — END OF VISIT
[] : Fellow [Time Spent: ___ minutes] : I have spent [unfilled] minutes of time on the encounter which excludes teaching and separately reported services. [FreeTextEntry3] : I evaluated and discussed this patient with the Fellow at the time of the visit. I agree with the history, physical exam, and assessment and plan as written, unless noted below. Briefly:68F w/ HTN, HLD, obesity. Here for index avg risk COL. Will pursue @ [facility]. Reviewed recent UA, lipids, TSH, Vit D.

## 2024-09-05 NOTE — ASSESSMENT
[FreeTextEntry1] : This is a 68 year old female with essential hypertension and obesity who presents today to obtain a screening colonoscopy. Has never had colonoscopy in the past. Denies history of coronary artery disease or diabetes. Does not use blood thinning medications, insulin, or weight loss medications. Denies family history of colon cancer or advanced polyps. States that she plans to initiate wegovy for weight loss after colonoscopy completed.  #Screening colonoscopy for colorectal cancer - Discussed procedure in explicit detail. Discussed risks vs benefits including but not limited to perforation, bleeding, missed findings, infection, and cardio-respiratory risks of anesthesia. - Patient agrees to the procedure and gives verbal consent. Written consent will be obtained at time of procedure. - Reviewed miralax bowel preparation instructions in detail. - Will schedule colonoscopy at [facility] - Will need an adult escort the day of the procedure. - Patient aware to not initiate wegovy for weight loss until after colonoscopy completed  Follow up post procedure  Bob Odom DO Gastroenterology Fellow [facility], [facility]

## 2024-09-13 ENCOUNTER — APPOINTMENT (OUTPATIENT)
Dept: INTERNAL MEDICINE | Facility: CLINIC | Age: 69
End: 2024-09-13
Payer: MEDICARE

## 2024-09-13 ENCOUNTER — NON-APPOINTMENT (OUTPATIENT)
Age: 69
End: 2024-09-13

## 2024-09-13 VITALS
HEIGHT: 60 IN | BODY MASS INDEX: 36.91 KG/M2 | TEMPERATURE: 98 F | DIASTOLIC BLOOD PRESSURE: 89 MMHG | SYSTOLIC BLOOD PRESSURE: 133 MMHG | WEIGHT: 188 LBS | OXYGEN SATURATION: 95 % | HEART RATE: 84 BPM

## 2024-09-13 DIAGNOSIS — M25.569 PAIN IN UNSPECIFIED KNEE: ICD-10-CM

## 2024-09-13 DIAGNOSIS — J45.909 UNSPECIFIED ASTHMA, UNCOMPLICATED: ICD-10-CM

## 2024-09-13 DIAGNOSIS — R07.89 OTHER CHEST PAIN: ICD-10-CM

## 2024-09-13 PROCEDURE — 93000 ELECTROCARDIOGRAM COMPLETE: CPT

## 2024-09-13 PROCEDURE — G2211 COMPLEX E/M VISIT ADD ON: CPT

## 2024-09-13 PROCEDURE — 99215 OFFICE O/P EST HI 40 MIN: CPT

## 2024-09-13 RX ORDER — ALBUTEROL SULFATE 90 UG/1
108 (90 BASE) AEROSOL, METERED RESPIRATORY (INHALATION)
Qty: 1 | Refills: 3 | Status: ACTIVE | COMMUNITY
Start: 2024-09-13 | End: 1900-01-01

## 2024-09-13 RX ORDER — SEMAGLUTIDE 0.25 MG/.5ML
0.25 INJECTION, SOLUTION SUBCUTANEOUS
Qty: 1 | Refills: 1 | Status: ACTIVE | COMMUNITY
Start: 2024-09-13 | End: 1900-01-01

## 2024-09-13 NOTE — HISTORY OF PRESENT ILLNESS
[de-identified] : Ms. WEBER is a68 year F with PMH of HTN, HLD, obesity and insomnia who comes for follow-up.  Insomnia is getting better.  The patient did not start the Wegovy yet.  For the last 2 to 3 months she has been experience some chest tightness and pain to the left shoulder whenever she do physical activity also sometimes that she has shooting pain that goes to her face and feels short of breath walking.

## 2024-09-13 NOTE — REVIEW OF SYSTEMS
[Chest Pain] : chest pain [Palpitations] : no palpitations [Lower Ext Edema] : no lower extremity edema [Orthopnea] : no orthopnea [Shortness Of Breath] : shortness of breath [Wheezing] : no wheezing [Cough] : no cough [Dyspnea on Exertion] : dyspnea on exertion [Negative] : Constitutional

## 2024-09-17 ENCOUNTER — APPOINTMENT (OUTPATIENT)
Dept: ORTHOPEDIC SURGERY | Facility: CLINIC | Age: 69
End: 2024-09-17
Payer: MEDICARE

## 2024-09-17 DIAGNOSIS — M54.50 LOW BACK PAIN, UNSPECIFIED: ICD-10-CM

## 2024-09-17 DIAGNOSIS — M43.16 SPONDYLOLISTHESIS, LUMBAR REGION: ICD-10-CM

## 2024-09-17 PROCEDURE — 72110 X-RAY EXAM L-2 SPINE 4/>VWS: CPT

## 2024-09-17 PROCEDURE — 99214 OFFICE O/P EST MOD 30 MIN: CPT

## 2024-09-17 RX ORDER — GABAPENTIN 300 MG/1
300 CAPSULE ORAL TWICE DAILY
Qty: 60 | Refills: 1 | Status: ACTIVE | COMMUNITY
Start: 2024-09-17 | End: 1900-01-01
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
## 2024-09-22 NOTE — PHYSICAL EXAM
[de-identified] : General: No acute distress, conversant, well-nourished. Head: Normocephalic, atraumatic Neck: trachea midline, FROM Heart: normotensive and normal rate and rhythm Lungs: No labored breathing Skin: No abrasions, no rashes, no edema Psych: Alert and oriented to person, place and time Extremities: no peripheral edema or digital cyanosis Gait: Normal gait. Can perform tandem gait.   Vascular: warm and well perfused distally, palpable distal pulses MSK: Spine:  incision well healed  NEURO: Sensation           Left            C5     2/2                C6     2/2                C7     2/2                C8     2/2               T1     2/2                        Right          C5     2/2                C6     2/2                C7     2/2                C8     2/2               T1     2/2        Motor:                                                 Left              C5 (deltoid abduction)             5/5                C6 (biceps flexion)                   5/5                 C7 (triceps extension)             5/5                C8 (finger flexion)                     5/5                T1 (interosseous)                     5/5                                                            Right            C5 (deltoid abduction)             5/5                C6 (biceps flexion)                   5/5                 C7 (triceps extension)             5/5                C8 (finger flexion)                     5/5                T1 (interosseous)                     5/5                       Sensation  Left L2  -  2/2             Left L3  -  2/2 Left L4  -  2/2 Left L5  -  2/2 Left S1  -  2/2  Right L2  -  2/2             Right L3  -  2/2 Right L4  -  2/2 Right L5  -  2/2 Right S1  -  2/2  Motor:  Left L2 (hip flexion)                            5/5                 Left L3 (knee extension)                   5/5                 Left L4 (ankle dorsiflexion)                 5/5                 Left L5 (long toe extensor)                5/5                 Left S1 (ankle plantar flexion)           5/5  Right L2 (hip flexion)                            5/5                 Right L3 (knee extension)                   5/5                 Right L4 (ankle dorsiflexion)                 5/5                 Right L5 (long toe extensor)                5/5                 Right S1 (ankle plantar flexion)           5/5  Reflexes: Normal and symmetric Negative Spurlings test.  Negative Hoffmans reflex.   Negative clonus.  Down-going Babinski. [de-identified] : I ordered radiographs to evaluate the patient's symptoms. Lumbar 4 view radiographs taken in the office today show no dislocation or fracture.  s/p L4-L5 TLIF with hardware in appropriate position and alignment

## 2024-09-22 NOTE — PHYSICAL EXAM
[de-identified] : General: No acute distress, conversant, well-nourished. Head: Normocephalic, atraumatic Neck: trachea midline, FROM Heart: normotensive and normal rate and rhythm Lungs: No labored breathing Skin: No abrasions, no rashes, no edema Psych: Alert and oriented to person, place and time Extremities: no peripheral edema or digital cyanosis Gait: Normal gait. Can perform tandem gait.   Vascular: warm and well perfused distally, palpable distal pulses MSK: Spine:  incision well healed  NEURO: Sensation           Left            C5     2/2                C6     2/2                C7     2/2                C8     2/2               T1     2/2                        Right          C5     2/2                C6     2/2                C7     2/2                C8     2/2               T1     2/2        Motor:                                                 Left              C5 (deltoid abduction)             5/5                C6 (biceps flexion)                   5/5                 C7 (triceps extension)             5/5                C8 (finger flexion)                     5/5                T1 (interosseous)                     5/5                                                            Right            C5 (deltoid abduction)             5/5                C6 (biceps flexion)                   5/5                 C7 (triceps extension)             5/5                C8 (finger flexion)                     5/5                T1 (interosseous)                     5/5                       Sensation  Left L2  -  2/2             Left L3  -  2/2 Left L4  -  2/2 Left L5  -  2/2 Left S1  -  2/2  Right L2  -  2/2             Right L3  -  2/2 Right L4  -  2/2 Right L5  -  2/2 Right S1  -  2/2  Motor:  Left L2 (hip flexion)                            5/5                 Left L3 (knee extension)                   5/5                 Left L4 (ankle dorsiflexion)                 5/5                 Left L5 (long toe extensor)                5/5                 Left S1 (ankle plantar flexion)           5/5  Right L2 (hip flexion)                            5/5                 Right L3 (knee extension)                   5/5                 Right L4 (ankle dorsiflexion)                 5/5                 Right L5 (long toe extensor)                5/5                 Right S1 (ankle plantar flexion)           5/5  Reflexes: Normal and symmetric Negative Spurlings test.  Negative Hoffmans reflex.   Negative clonus.  Down-going Babinski. [de-identified] : I ordered radiographs to evaluate the patient's symptoms. Lumbar 4 view radiographs taken in the office today show no dislocation or fracture.  s/p L4-L5 TLIF with hardware in appropriate position and alignment
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
## 2024-09-22 NOTE — REASON FOR VISIT
[Follow-Up Visit] : a follow-up visit for [Back Pain] : back pain [FreeTextEntry2] : pain level 8/10
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
## 2024-11-06 ENCOUNTER — APPOINTMENT (OUTPATIENT)
Dept: ORTHOPEDIC SURGERY | Facility: CLINIC | Age: 69
End: 2024-11-06
Payer: MEDICARE

## 2024-11-06 DIAGNOSIS — M43.16 SPONDYLOLISTHESIS, LUMBAR REGION: ICD-10-CM

## 2024-11-06 DIAGNOSIS — M54.16 RADICULOPATHY, LUMBAR REGION: ICD-10-CM

## 2024-11-06 DIAGNOSIS — M54.50 LOW BACK PAIN, UNSPECIFIED: ICD-10-CM

## 2024-11-06 PROCEDURE — 72110 X-RAY EXAM L-2 SPINE 4/>VWS: CPT

## 2024-11-06 PROCEDURE — 99214 OFFICE O/P EST MOD 30 MIN: CPT

## 2024-11-06 RX ORDER — TIZANIDINE 2 MG/1
2 TABLET ORAL
Qty: 40 | Refills: 1 | Status: ACTIVE | COMMUNITY
Start: 2024-11-06 | End: 1900-01-01

## 2024-11-18 ENCOUNTER — NON-APPOINTMENT (OUTPATIENT)
Age: 69
End: 2024-11-18

## 2024-12-06 ENCOUNTER — APPOINTMENT (OUTPATIENT)
Dept: INTERNAL MEDICINE | Facility: CLINIC | Age: 69
End: 2024-12-06
Payer: MEDICARE

## 2024-12-06 VITALS
WEIGHT: 186 LBS | OXYGEN SATURATION: 96 % | HEIGHT: 60 IN | TEMPERATURE: 97.8 F | SYSTOLIC BLOOD PRESSURE: 125 MMHG | BODY MASS INDEX: 36.52 KG/M2 | DIASTOLIC BLOOD PRESSURE: 87 MMHG | HEART RATE: 86 BPM

## 2024-12-06 DIAGNOSIS — E78.5 HYPERLIPIDEMIA, UNSPECIFIED: ICD-10-CM

## 2024-12-06 DIAGNOSIS — M54.16 RADICULOPATHY, LUMBAR REGION: ICD-10-CM

## 2024-12-06 DIAGNOSIS — M25.569 PAIN IN UNSPECIFIED KNEE: ICD-10-CM

## 2024-12-06 DIAGNOSIS — I10 ESSENTIAL (PRIMARY) HYPERTENSION: ICD-10-CM

## 2024-12-06 DIAGNOSIS — F41.8 OTHER SPECIFIED ANXIETY DISORDERS: ICD-10-CM

## 2024-12-06 PROCEDURE — 36415 COLL VENOUS BLD VENIPUNCTURE: CPT

## 2024-12-06 PROCEDURE — G2211 COMPLEX E/M VISIT ADD ON: CPT

## 2024-12-06 PROCEDURE — G0444 DEPRESSION SCREEN ANNUAL: CPT | Mod: 59

## 2024-12-06 PROCEDURE — 99214 OFFICE O/P EST MOD 30 MIN: CPT

## 2024-12-06 RX ORDER — PREGABALIN 200 MG/1
200 CAPSULE ORAL DAILY
Qty: 60 | Refills: 1 | Status: ACTIVE | COMMUNITY
Start: 2024-12-06 | End: 1900-01-01

## 2024-12-06 RX ORDER — ALPRAZOLAM 0.5 MG/1
0.5 TABLET ORAL
Qty: 6 | Refills: 0 | Status: ACTIVE | COMMUNITY
Start: 2024-12-06 | End: 1900-01-01

## 2024-12-06 RX ORDER — SERTRALINE HYDROCHLORIDE 50 MG/1
50 TABLET, FILM COATED ORAL
Qty: 90 | Refills: 1 | Status: ACTIVE | COMMUNITY
Start: 2024-12-06 | End: 1900-01-01

## 2024-12-09 LAB
CHOLEST SERPL-MCNC: 161 MG/DL
HDLC SERPL-MCNC: 50 MG/DL
LDLC SERPL CALC-MCNC: 93 MG/DL
NONHDLC SERPL-MCNC: 111 MG/DL
TRIGL SERPL-MCNC: 98 MG/DL

## 2025-01-22 ENCOUNTER — OUTPATIENT (OUTPATIENT)
Dept: OUTPATIENT SERVICES | Facility: HOSPITAL | Age: 70
LOS: 1 days | Discharge: HOME | End: 2025-01-22
Payer: MEDICARE

## 2025-01-22 ENCOUNTER — RESULT REVIEW (OUTPATIENT)
Age: 70
End: 2025-01-22

## 2025-01-22 ENCOUNTER — APPOINTMENT (OUTPATIENT)
Dept: GASTROENTEROLOGY | Facility: HOSPITAL | Age: 70
End: 2025-01-22

## 2025-01-22 VITALS
TEMPERATURE: 98 F | OXYGEN SATURATION: 95 % | WEIGHT: 188.05 LBS | SYSTOLIC BLOOD PRESSURE: 121 MMHG | HEIGHT: 60 IN | HEART RATE: 73 BPM | DIASTOLIC BLOOD PRESSURE: 85 MMHG | RESPIRATION RATE: 17 BRPM

## 2025-01-22 DIAGNOSIS — Z41.9 ENCOUNTER FOR PROCEDURE FOR PURPOSES OTHER THAN REMEDYING HEALTH STATE, UNSPECIFIED: Chronic | ICD-10-CM

## 2025-01-22 PROCEDURE — 45385 COLONOSCOPY W/LESION REMOVAL: CPT

## 2025-01-22 PROCEDURE — 45380 COLONOSCOPY AND BIOPSY: CPT | Mod: PT,XS

## 2025-01-22 PROCEDURE — 88305 TISSUE EXAM BY PATHOLOGIST: CPT | Mod: 26

## 2025-01-22 PROCEDURE — 88305 TISSUE EXAM BY PATHOLOGIST: CPT

## 2025-01-22 PROCEDURE — 45385 COLONOSCOPY W/LESION REMOVAL: CPT | Mod: PT

## 2025-01-22 PROCEDURE — 45380 COLONOSCOPY AND BIOPSY: CPT | Mod: 59

## 2025-01-22 NOTE — PRE-ANESTHESIA EVALUATION ADULT - NSANTHPMHFT_GEN_ALL_CORE
Cardiac: Positive for HTN, HLD. Denies MI/Angina/Heart Failure, Arrhythmia, Murmur/Valvular Disorder. >4 METS  Pulmonary: Positive for Asthma. Denies COPD, ESTEFANY.  Renal: Denies kidney dysfunction  Hepatic: Denies liver dysfunction  Gastrointestinal: Denies GERD/IBS  Endocrine: Denies DM or thyroid dysfunction.  Neurologic: Positive for lumbar radiculopathy/sponylolisthesis, lumbar stenosis with neurogenic claudication. Denies stroke/seizure disorder.  Hematologic: Denies anemia, blood clotting disorder, blood thinning medication aside from aspirin 81 mg PO q Day.    PSH: Hand surgery, lumbar spine surgery.

## 2025-01-27 ENCOUNTER — NON-APPOINTMENT (OUTPATIENT)
Age: 70
End: 2025-01-27

## 2025-02-25 ENCOUNTER — APPOINTMENT (OUTPATIENT)
Dept: HEART AND VASCULAR | Facility: CLINIC | Age: 70
End: 2025-02-25

## 2025-02-25 VITALS
HEIGHT: 60 IN | BODY MASS INDEX: 37.11 KG/M2 | WEIGHT: 189 LBS | OXYGEN SATURATION: 94 % | SYSTOLIC BLOOD PRESSURE: 117 MMHG | TEMPERATURE: 98 F | DIASTOLIC BLOOD PRESSURE: 77 MMHG | HEART RATE: 93 BPM

## 2025-02-25 DIAGNOSIS — R07.89 OTHER CHEST PAIN: ICD-10-CM

## 2025-02-25 DIAGNOSIS — R06.02 SHORTNESS OF BREATH: ICD-10-CM

## 2025-02-25 PROCEDURE — 99215 OFFICE O/P EST HI 40 MIN: CPT

## 2025-02-25 PROCEDURE — 93000 ELECTROCARDIOGRAM COMPLETE: CPT

## 2025-02-25 PROCEDURE — G2211 COMPLEX E/M VISIT ADD ON: CPT

## 2025-03-07 ENCOUNTER — APPOINTMENT (OUTPATIENT)
Dept: INTERNAL MEDICINE | Facility: CLINIC | Age: 70
End: 2025-03-07

## 2025-03-11 ENCOUNTER — APPOINTMENT (OUTPATIENT)
Dept: INTERNAL MEDICINE | Facility: CLINIC | Age: 70
End: 2025-03-11

## 2025-03-17 ENCOUNTER — OUTPATIENT (OUTPATIENT)
Dept: OUTPATIENT SERVICES | Facility: HOSPITAL | Age: 70
LOS: 1 days | End: 2025-03-17

## 2025-03-17 ENCOUNTER — APPOINTMENT (OUTPATIENT)
Dept: SLEEP CENTER | Facility: HOME HEALTH | Age: 70
End: 2025-03-17
Payer: MEDICARE

## 2025-03-17 DIAGNOSIS — Z41.9 ENCOUNTER FOR PROCEDURE FOR PURPOSES OTHER THAN REMEDYING HEALTH STATE, UNSPECIFIED: Chronic | ICD-10-CM

## 2025-03-17 PROCEDURE — 95800 SLP STDY UNATTENDED: CPT | Mod: 26

## 2025-03-18 DIAGNOSIS — G47.33 OBSTRUCTIVE SLEEP APNEA (ADULT) (PEDIATRIC): ICD-10-CM

## 2025-03-19 ENCOUNTER — NON-APPOINTMENT (OUTPATIENT)
Age: 70
End: 2025-03-19

## 2025-03-19 ENCOUNTER — APPOINTMENT (OUTPATIENT)
Dept: INTERNAL MEDICINE | Facility: CLINIC | Age: 70
End: 2025-03-19
Payer: MEDICARE

## 2025-03-19 VITALS
WEIGHT: 189 LBS | BODY MASS INDEX: 36.91 KG/M2 | OXYGEN SATURATION: 96 % | HEART RATE: 86 BPM | SYSTOLIC BLOOD PRESSURE: 108 MMHG | DIASTOLIC BLOOD PRESSURE: 74 MMHG

## 2025-03-19 DIAGNOSIS — F41.8 OTHER SPECIFIED ANXIETY DISORDERS: ICD-10-CM

## 2025-03-19 DIAGNOSIS — I10 ESSENTIAL (PRIMARY) HYPERTENSION: ICD-10-CM

## 2025-03-19 DIAGNOSIS — J45.909 UNSPECIFIED ASTHMA, UNCOMPLICATED: ICD-10-CM

## 2025-03-19 DIAGNOSIS — R07.89 OTHER CHEST PAIN: ICD-10-CM

## 2025-03-19 PROCEDURE — 99213 OFFICE O/P EST LOW 20 MIN: CPT

## 2025-03-19 PROCEDURE — G2211 COMPLEX E/M VISIT ADD ON: CPT

## 2025-03-19 RX ORDER — FLUTICASONE PROPIONATE 44 UG/1
44 AEROSOL, METERED RESPIRATORY (INHALATION)
Qty: 1 | Refills: 1 | Status: ACTIVE | COMMUNITY
Start: 2025-03-19 | End: 1900-01-01

## 2025-03-19 RX ORDER — BUPROPION HYDROCHLORIDE 150 MG/1
150 TABLET, EXTENDED RELEASE ORAL DAILY
Qty: 90 | Refills: 1 | Status: ACTIVE | COMMUNITY
Start: 2025-03-19 | End: 1900-01-01

## 2025-04-02 ENCOUNTER — OUTPATIENT (OUTPATIENT)
Dept: OUTPATIENT SERVICES | Facility: HOSPITAL | Age: 70
LOS: 1 days | End: 2025-04-02
Payer: MEDICARE

## 2025-04-02 ENCOUNTER — APPOINTMENT (OUTPATIENT)
Dept: CT IMAGING | Facility: HOSPITAL | Age: 70
End: 2025-04-02

## 2025-04-02 DIAGNOSIS — Z41.9 ENCOUNTER FOR PROCEDURE FOR PURPOSES OTHER THAN REMEDYING HEALTH STATE, UNSPECIFIED: Chronic | ICD-10-CM

## 2025-04-02 PROCEDURE — 75574 CT ANGIO HRT W/3D IMAGE: CPT | Mod: 26

## 2025-04-02 PROCEDURE — 75574 CT ANGIO HRT W/3D IMAGE: CPT

## 2025-04-02 PROCEDURE — 82565 ASSAY OF CREATININE: CPT

## 2025-04-11 ENCOUNTER — OUTPATIENT (OUTPATIENT)
Dept: OUTPATIENT SERVICES | Facility: HOSPITAL | Age: 70
LOS: 1 days | End: 2025-04-11
Payer: MEDICARE

## 2025-04-11 ENCOUNTER — RESULT REVIEW (OUTPATIENT)
Age: 70
End: 2025-04-11

## 2025-04-11 DIAGNOSIS — R07.89 OTHER CHEST PAIN: ICD-10-CM

## 2025-04-11 DIAGNOSIS — Z41.9 ENCOUNTER FOR PROCEDURE FOR PURPOSES OTHER THAN REMEDYING HEALTH STATE, UNSPECIFIED: Chronic | ICD-10-CM

## 2025-04-11 PROCEDURE — 93356 MYOCRD STRAIN IMG SPCKL TRCK: CPT

## 2025-04-11 PROCEDURE — 93306 TTE W/DOPPLER COMPLETE: CPT

## 2025-04-11 PROCEDURE — 93306 TTE W/DOPPLER COMPLETE: CPT | Mod: 26

## 2025-04-15 ENCOUNTER — APPOINTMENT (OUTPATIENT)
Dept: HEART AND VASCULAR | Facility: CLINIC | Age: 70
End: 2025-04-15
Payer: MEDICARE

## 2025-04-15 VITALS
OXYGEN SATURATION: 95 % | BODY MASS INDEX: 36.91 KG/M2 | HEIGHT: 60 IN | WEIGHT: 188 LBS | SYSTOLIC BLOOD PRESSURE: 104 MMHG | HEART RATE: 94 BPM | DIASTOLIC BLOOD PRESSURE: 74 MMHG

## 2025-04-15 DIAGNOSIS — G47.30 SLEEP APNEA, UNSPECIFIED: ICD-10-CM

## 2025-04-15 DIAGNOSIS — R07.89 OTHER CHEST PAIN: ICD-10-CM

## 2025-04-15 PROCEDURE — G2211 COMPLEX E/M VISIT ADD ON: CPT

## 2025-04-15 PROCEDURE — 99215 OFFICE O/P EST HI 40 MIN: CPT

## 2025-04-15 RX ORDER — AZELASTINE HYDROCHLORIDE 137 UG/1
0.1 SPRAY, METERED NASAL TWICE DAILY
Qty: 1 | Refills: 5 | Status: ACTIVE | COMMUNITY
Start: 2025-04-15 | End: 1900-01-01

## 2025-04-15 RX ORDER — OLMESARTAN MEDOXOMIL 20 MG/1
20 TABLET, FILM COATED ORAL DAILY
Qty: 90 | Refills: 3 | Status: ACTIVE | COMMUNITY
Start: 2025-04-15 | End: 1900-01-01

## 2025-04-15 RX ORDER — BUPROPION HYDROCHLORIDE 150 MG/1
150 TABLET, FILM COATED, EXTENDED RELEASE ORAL
Qty: 90 | Refills: 0 | Status: ACTIVE | COMMUNITY
Start: 2025-04-15 | End: 1900-01-01

## 2025-04-23 NOTE — PRE-ANESTHESIA EVALUATION ADULT - NSANTHOSAYNRD_GEN_A_CORE
61
No. ESTEFANY screening performed.  STOP BANG Legend: 0-2 = LOW Risk; 3-4 = INTERMEDIATE Risk; 5-8 = HIGH Risk

## 2025-05-27 ENCOUNTER — APPOINTMENT (OUTPATIENT)
Dept: PULMONOLOGY | Facility: CLINIC | Age: 70
End: 2025-05-27
Payer: MEDICARE

## 2025-05-27 VITALS
TEMPERATURE: 96.9 F | WEIGHT: 189.25 LBS | HEIGHT: 60 IN | SYSTOLIC BLOOD PRESSURE: 120 MMHG | BODY MASS INDEX: 37.15 KG/M2 | HEART RATE: 86 BPM | DIASTOLIC BLOOD PRESSURE: 89 MMHG | OXYGEN SATURATION: 96 %

## 2025-05-27 DIAGNOSIS — E66.9 OBESITY, UNSPECIFIED: ICD-10-CM

## 2025-05-27 DIAGNOSIS — R40.0 SOMNOLENCE: ICD-10-CM

## 2025-05-27 DIAGNOSIS — J45.909 UNSPECIFIED ASTHMA, UNCOMPLICATED: ICD-10-CM

## 2025-05-27 DIAGNOSIS — G47.30 SLEEP APNEA, UNSPECIFIED: ICD-10-CM

## 2025-05-27 PROCEDURE — G2211 COMPLEX E/M VISIT ADD ON: CPT

## 2025-05-27 PROCEDURE — 99204 OFFICE O/P NEW MOD 45 MIN: CPT

## 2025-05-27 RX ORDER — FLUTICASONE FUROATE, UMECLIDINIUM BROMIDE AND VILANTEROL TRIFENATATE 200; 62.5; 25 UG/1; UG/1; UG/1
200-62.5-25 POWDER RESPIRATORY (INHALATION) DAILY
Qty: 1 | Refills: 6 | Status: ACTIVE | COMMUNITY
Start: 2025-05-27 | End: 1900-01-01

## 2025-05-28 PROBLEM — R40.0 DAYTIME SLEEPINESS: Status: ACTIVE | Noted: 2025-05-28

## 2025-05-29 ENCOUNTER — OUTPATIENT (OUTPATIENT)
Dept: OUTPATIENT SERVICES | Facility: HOSPITAL | Age: 70
LOS: 1 days | End: 2025-05-29
Payer: MEDICARE

## 2025-05-29 ENCOUNTER — APPOINTMENT (OUTPATIENT)
Dept: CT IMAGING | Facility: HOSPITAL | Age: 70
End: 2025-05-29

## 2025-05-29 DIAGNOSIS — Z41.9 ENCOUNTER FOR PROCEDURE FOR PURPOSES OTHER THAN REMEDYING HEALTH STATE, UNSPECIFIED: Chronic | ICD-10-CM

## 2025-05-29 PROCEDURE — 71250 CT THORAX DX C-: CPT | Mod: 26

## 2025-05-29 PROCEDURE — 71250 CT THORAX DX C-: CPT

## 2025-06-06 ENCOUNTER — RX RENEWAL (OUTPATIENT)
Age: 70
End: 2025-06-06

## 2025-06-23 ENCOUNTER — RX RENEWAL (OUTPATIENT)
Age: 70
End: 2025-06-23

## 2025-06-30 ENCOUNTER — APPOINTMENT (OUTPATIENT)
Dept: INTERNAL MEDICINE | Facility: CLINIC | Age: 70
End: 2025-06-30
Payer: MEDICARE

## 2025-06-30 VITALS
HEART RATE: 92 BPM | BODY MASS INDEX: 36.91 KG/M2 | OXYGEN SATURATION: 96 % | SYSTOLIC BLOOD PRESSURE: 100 MMHG | DIASTOLIC BLOOD PRESSURE: 70 MMHG | WEIGHT: 189 LBS

## 2025-06-30 PROCEDURE — 99213 OFFICE O/P EST LOW 20 MIN: CPT

## 2025-06-30 PROCEDURE — G2211 COMPLEX E/M VISIT ADD ON: CPT

## 2025-06-30 RX ORDER — BUPROPION HYDROCHLORIDE 300 MG/1
300 TABLET, EXTENDED RELEASE ORAL
Qty: 90 | Refills: 0 | Status: ACTIVE | COMMUNITY
Start: 2025-06-30 | End: 1900-01-01

## 2025-07-02 RX ORDER — TIRZEPATIDE 2.5 MG/.5ML
2.5 INJECTION, SOLUTION SUBCUTANEOUS
Qty: 1 | Refills: 2 | Status: ACTIVE | COMMUNITY
Start: 2025-06-30 | End: 1900-01-01

## 2025-07-07 ENCOUNTER — NON-APPOINTMENT (OUTPATIENT)
Age: 70
End: 2025-07-07

## 2025-07-28 ENCOUNTER — APPOINTMENT (OUTPATIENT)
Dept: PULMONOLOGY | Facility: CLINIC | Age: 70
End: 2025-07-28

## 2025-07-29 ENCOUNTER — APPOINTMENT (OUTPATIENT)
Dept: PULMONOLOGY | Facility: CLINIC | Age: 70
End: 2025-07-29
Payer: MEDICARE

## 2025-07-29 VITALS
DIASTOLIC BLOOD PRESSURE: 70 MMHG | OXYGEN SATURATION: 91 % | SYSTOLIC BLOOD PRESSURE: 99 MMHG | HEIGHT: 60 IN | TEMPERATURE: 97.7 F | HEART RATE: 93 BPM

## 2025-07-29 DIAGNOSIS — E66.9 OBESITY, UNSPECIFIED: ICD-10-CM

## 2025-07-29 DIAGNOSIS — J45.909 UNSPECIFIED ASTHMA, UNCOMPLICATED: ICD-10-CM

## 2025-07-29 DIAGNOSIS — R06.02 SHORTNESS OF BREATH: ICD-10-CM

## 2025-07-29 DIAGNOSIS — G47.30 SLEEP APNEA, UNSPECIFIED: ICD-10-CM

## 2025-07-29 PROCEDURE — G2211 COMPLEX E/M VISIT ADD ON: CPT

## 2025-07-29 PROCEDURE — 99214 OFFICE O/P EST MOD 30 MIN: CPT

## 2025-07-29 RX ORDER — ALBUTEROL SULFATE 90 UG/1
108 (90 BASE) INHALANT RESPIRATORY (INHALATION)
Qty: 1 | Refills: 5 | Status: ACTIVE | COMMUNITY
Start: 2025-07-29 | End: 1900-01-01

## 2025-08-11 ENCOUNTER — APPOINTMENT (OUTPATIENT)
Dept: PULMONOLOGY | Facility: CLINIC | Age: 70
End: 2025-08-11

## 2025-09-02 ENCOUNTER — APPOINTMENT (OUTPATIENT)
Dept: INTERNAL MEDICINE | Facility: CLINIC | Age: 70
End: 2025-09-02
Payer: MEDICARE

## 2025-09-02 VITALS
SYSTOLIC BLOOD PRESSURE: 112 MMHG | HEART RATE: 90 BPM | BODY MASS INDEX: 35.54 KG/M2 | OXYGEN SATURATION: 95 % | DIASTOLIC BLOOD PRESSURE: 76 MMHG | WEIGHT: 182 LBS

## 2025-09-02 DIAGNOSIS — Z23 ENCOUNTER FOR IMMUNIZATION: ICD-10-CM

## 2025-09-02 DIAGNOSIS — I10 ESSENTIAL (PRIMARY) HYPERTENSION: ICD-10-CM

## 2025-09-02 DIAGNOSIS — E66.9 OBESITY, UNSPECIFIED: ICD-10-CM

## 2025-09-02 DIAGNOSIS — J45.909 UNSPECIFIED ASTHMA, UNCOMPLICATED: ICD-10-CM

## 2025-09-02 DIAGNOSIS — F41.8 OTHER SPECIFIED ANXIETY DISORDERS: ICD-10-CM

## 2025-09-02 DIAGNOSIS — Z00.00 ENCOUNTER FOR GENERAL ADULT MEDICAL EXAMINATION W/OUT ABNORMAL FINDINGS: ICD-10-CM

## 2025-09-02 PROCEDURE — G2211 COMPLEX E/M VISIT ADD ON: CPT

## 2025-09-02 PROCEDURE — 99214 OFFICE O/P EST MOD 30 MIN: CPT | Mod: 25

## 2025-09-02 PROCEDURE — 36415 COLL VENOUS BLD VENIPUNCTURE: CPT

## 2025-09-02 PROCEDURE — G0439: CPT

## 2025-09-03 LAB
ALBUMIN SERPL ELPH-MCNC: 4.5 G/DL
ALP BLD-CCNC: 75 U/L
ALT SERPL-CCNC: 46 U/L
ANION GAP SERPL CALC-SCNC: 13 MMOL/L
AST SERPL-CCNC: 35 U/L
BASOPHILS # BLD AUTO: 0.06 K/UL
BASOPHILS NFR BLD AUTO: 1 %
BILIRUB SERPL-MCNC: 0.4 MG/DL
BUN SERPL-MCNC: 14 MG/DL
CALCIUM SERPL-MCNC: 9.9 MG/DL
CHLORIDE SERPL-SCNC: 102 MMOL/L
CHOLEST SERPL-MCNC: 131 MG/DL
CO2 SERPL-SCNC: 22 MMOL/L
CREAT SERPL-MCNC: 0.97 MG/DL
EGFRCR SERPLBLD CKD-EPI 2021: 64 ML/MIN/1.73M2
EOSINOPHIL # BLD AUTO: 0.11 K/UL
EOSINOPHIL NFR BLD AUTO: 1.8 %
ESTIMATED AVERAGE GLUCOSE: 126 MG/DL
GLUCOSE SERPL-MCNC: 91 MG/DL
HBA1C MFR BLD HPLC: 6 %
HCT VFR BLD CALC: 39.4 %
HDLC SERPL-MCNC: 38 MG/DL
HGB BLD-MCNC: 13.1 G/DL
IMM GRANULOCYTES NFR BLD AUTO: 0.3 %
LDLC SERPL-MCNC: 78 MG/DL
LYMPHOCYTES # BLD AUTO: 3.35 K/UL
LYMPHOCYTES NFR BLD AUTO: 53.6 %
MAN DIFF?: NORMAL
MCHC RBC-ENTMCNC: 29.8 PG
MCHC RBC-ENTMCNC: 33.2 G/DL
MCV RBC AUTO: 89.7 FL
MONOCYTES # BLD AUTO: 0.43 K/UL
MONOCYTES NFR BLD AUTO: 6.9 %
NEUTROPHILS # BLD AUTO: 2.28 K/UL
NEUTROPHILS NFR BLD AUTO: 36.4 %
NONHDLC SERPL-MCNC: 94 MG/DL
PLATELET # BLD AUTO: 237 K/UL
POTASSIUM SERPL-SCNC: 4.2 MMOL/L
PROT SERPL-MCNC: 7.7 G/DL
RBC # BLD: 4.39 M/UL
RBC # FLD: 13.8 %
SODIUM SERPL-SCNC: 138 MMOL/L
TRIGL SERPL-MCNC: 83 MG/DL
TSH SERPL-ACNC: 0.96 UIU/ML
WBC # FLD AUTO: 6.25 K/UL

## 2025-09-05 RX ORDER — TIRZEPATIDE 5 MG/.5ML
5 INJECTION, SOLUTION SUBCUTANEOUS
Qty: 1 | Refills: 1 | Status: ACTIVE | COMMUNITY
Start: 2025-09-02

## 2025-09-12 ENCOUNTER — APPOINTMENT (OUTPATIENT)
Dept: INTERNAL MEDICINE | Facility: CLINIC | Age: 70
End: 2025-09-12

## (undated) DEVICE — DRAPE 3/4 SHEET 52X76"

## (undated) DEVICE — MIDAS REX MR8 BALL FLUTED SM BORE 5MM X 10CM

## (undated) DEVICE — NDL CHRONOS BMA 11GA

## (undated) DEVICE — ELCTR AQUAMANTYS BIPOLAR SEALER 6.0

## (undated) DEVICE — DRAPE INSTRUMENT POUCH 6.75" X 11"

## (undated) DEVICE — MIDAS REX MR8 MATCH HEAD FLUTED LG BORE 3MM X 14CM

## (undated) DEVICE — KIT MAGELLAN MAR0MAX

## (undated) DEVICE — STRYKER BONE MILL BLADE FINE 3.2MM

## (undated) DEVICE — NDL SPINAL 18G X 3.5" (PINK)

## (undated) DEVICE — SPONGE SURGICAL STRIP 1/2 X 6"

## (undated) DEVICE — DRAIN JACKSON PRATT 10MM FLAT 3/4 NO TROCAR

## (undated) DEVICE — DRAPE C ARM 41X74"

## (undated) DEVICE — POLY TRAP ETRAP

## (undated) DEVICE — SPONGE SURGICAL STRIP 1/4 X 6"

## (undated) DEVICE — FOLEY TRAY 16FR LF URINE METER SURESTEP

## (undated) DEVICE — STAPLER SKIN PROXIMATE

## (undated) DEVICE — DRAIN RESERVOIR FOR JACKSON PRATT 100CC CARDINAL

## (undated) DEVICE — WARMING BLANKET UPPER ADULT

## (undated) DEVICE — NDL HYPO SAFE 22G X 1.5" (BLACK)

## (undated) DEVICE — SUT VICRYL 0 18" CT-1 UNDYED (POP-OFF)

## (undated) DEVICE — STRYKER INSTRUMENT BATTERY

## (undated) DEVICE — COVER BACK TABLE STRL 80/110IN 10/CA

## (undated) DEVICE — FORCEP RADIAL JAW 4 240CM DISP

## (undated) DEVICE — STRYKER BONE MILL BLADE MEDIUM 5.0MM

## (undated) DEVICE — DRAPE 1/2 SHEET 40X57"

## (undated) DEVICE — DRSG TEGADERM 4X4.75"

## (undated) DEVICE — ARTERIOCYTE MAGELLAN MAR0MAX CONVENIENCE QUCK DRAW

## (undated) DEVICE — SUT MONOCRYL 3-0 18" PS-1

## (undated) DEVICE — DRAPE C ARM C-ARMOUR

## (undated) DEVICE — DRAPE SURGICAL #1010

## (undated) DEVICE — SNARE LESIONHUNTER ROTAT NITNL COLD 10MM

## (undated) DEVICE — DRSG DERMABOND PRINEO 22CM

## (undated) DEVICE — MIDAS REX MR8 MATCH HEAD FLUTED SM BORE 2.2MM X 10CM

## (undated) DEVICE — PACK SPINE

## (undated) DEVICE — MARKING PEN W RULER

## (undated) DEVICE — MIDAS REX MR8 METAL CUTTER 3MM X 9CM

## (undated) DEVICE — VENODYNE/SCD SLEEVE CALF MEDIUM

## (undated) DEVICE — SUT VICRYL PLUS 2-0 18" CT-2 (POP-OFF)